# Patient Record
Sex: FEMALE | Race: OTHER | URBAN - METROPOLITAN AREA
[De-identification: names, ages, dates, MRNs, and addresses within clinical notes are randomized per-mention and may not be internally consistent; named-entity substitution may affect disease eponyms.]

---

## 2017-02-10 ENCOUNTER — HOSPITAL ENCOUNTER (OUTPATIENT)
Dept: LAB | Age: 32
Discharge: HOME | End: 2017-02-10

## 2017-02-10 ENCOUNTER — OFFICE VISIT (OUTPATIENT)
Dept: FAMILY MEDICINE CLINIC | Age: 32
End: 2017-02-10

## 2017-02-10 VITALS
HEART RATE: 87 BPM | TEMPERATURE: 98.3 F | WEIGHT: 142 LBS | BODY MASS INDEX: 25.35 KG/M2 | DIASTOLIC BLOOD PRESSURE: 89 MMHG | SYSTOLIC BLOOD PRESSURE: 129 MMHG

## 2017-02-10 DIAGNOSIS — Z01.419 WELL WOMAN EXAM: Primary | ICD-10-CM

## 2017-02-10 DIAGNOSIS — K21.9 GASTROESOPHAGEAL REFLUX DISEASE, ESOPHAGITIS PRESENCE NOT SPECIFIED: ICD-10-CM

## 2017-02-10 PROCEDURE — 87591 N.GONORRHOEAE DNA AMP PROB: CPT | Performed by: FAMILY MEDICINE

## 2017-02-10 PROCEDURE — 87491 CHLMYD TRACH DNA AMP PROBE: CPT | Performed by: FAMILY MEDICINE

## 2017-02-10 PROCEDURE — 88142 CYTOPATH C/V THIN LAYER: CPT | Performed by: FAMILY MEDICINE

## 2017-02-10 RX ORDER — RANITIDINE HCL 75 MG
75 TABLET ORAL 2 TIMES DAILY
Qty: 60 TAB | Refills: 2 | Status: SHIPPED | OUTPATIENT
Start: 2017-02-10 | End: 2018-04-27

## 2017-02-10 NOTE — PROGRESS NOTES
Coordination of Care  1. Have you been to the ER, urgent care clinic since your last visit? Hospitalized since your last visit? No    2. Have you seen or consulted any other health care providers outside of the [address] [place] [address] since your last visit? Include any pap smears or colon screening. No    Medications  Medication Reconciliation Performed: yes  Patient does not need refills     Learning Assessment Complete?  yes

## 2017-02-10 NOTE — MR AVS SNAPSHOT
Visit Information Radha Sanchez Personal Médico Departamento Teléfono del Dep. Número de visita 2/10/2017  1:15 PM Marguerite Mondragon, [address] [place] [address] [phone] Follow-up Instructions Return if symptoms worsen or fail to improve. Upcoming Health Maintenance Date Due DTaP/Tdap/Td series (1 - Tdap) 9/6/2004 PAP AKA CERVICAL CYTOLOGY 9/6/2004 INFLUENZA AGE 9 TO ADULT 8/1/2016 Alergias  Review Complete El: 2/10/2017 Por: Marguerite Mondragon MD  
 A partir del:  2/10/2017 No Known Allergies Vacunas actuales Mardee Romano No hay ninguna vacuna archivada. No revisadas esta visita You Were Diagnosed With   
  
 Alex Colder Well woman exam    -  Primary ICD-10-CM: Z79.979 ICD-9-CM: V72.31 Gastroesophageal reflux disease, esophagitis presence not specified     ICD-10-CM: K21.9 ICD-9-CM: 530.81 Partes vitales PS Pulso Temperatura Peso (percentil de crecimiento) LMP (última sachi) BMI (IMC)  
 129/89 (BP 1 Location: Right arm, BP Patient Position: Sitting) 87 98.3 °F (36.8 °C) (Oral) 142 lb (64.4 kg) 01/20/2017 25.35 kg/m2 Estado obstétrico Estatus de tabaquísmo Having regular periods Never Smoker Historial de signos vitales BMI and BSA Data Body Mass Index Body Surface Area  
 25.35 kg/m 2 1.69 m 2 Evalee Risser Pharmacy Name Phone [facility] [address] [facility] [phone] Rees lista de medicamentos actualizada Lista actualizada el: 2/10/17  1:42 PM.  [place] use rees lista de medicamentos más reciente.  
  
  
  
  
 loratadine 10 mg tablet También conocido rikki:  Nury [place] Take 1 Tab by mouth daily as needed for Allergies. Saira 1 tab diario para alergias  
  
 raNITIdine 75 mg tablet También conocido rikki:  ZANTAC Take 1 Tab by mouth two (2) times a day. Recetas Enviado a la Manju Refills  
 raNITIdine (ZANTAC) 75 mg tablet 2 Sig: Take 1 Tab by mouth two (2) times a day. Class: Normal  
 Pharmacy: [address] Ph #: [phone] Route: Oral  
  
Hicimos lo siguiente PAP, LB, RFX HPV DPRWE([id number]) [id number] CPT(R)] Instrucciones de seguimiento Return if symptoms worsen or fail to improve. Introducing [facility]! [facility] introduce portal paciente MyChart . Ahora se puede acceder a partes de jameson expediente médico, enviar por correo electrónico la oficina de jameson médico y solicitar renovaciones de medicamentos en línea. En jameson navegador de Internet , Mireya Galeana a [email or web address] Jesus clic en el usuario por Mohan Ted? Evangelist Short clic aquí en la sesión [facility]. Verá la página de registro [place]. Ingrese jameson código de Bank of Janice bryson y rikki aparece a continuación. Usted no tendrá que [employer] código después de ayse completado el proceso de registro . Si usted no se inscribe antes de la fecha de caducidad , debe solicitar un nuevo código. · MyChart Código de acceso : [id number] Expires: 5/11/2017  1:42 PM 
 
Ingresa los últimos cuatro dígitos de jameson Número de Seguro Social ( xxxx ) y fecha de nacimiento ( dd / mm / aaaa ) rikki se indica y jesus clic en Enviar. Usted será llevado a la siguiente página de registro . Crear un ID MyChart . Esta será jameson ID de inicio de sesión de MyChart y no puede ser [place] , por lo que pensar en seng que es Selinda Alycia y fácil de recordar . Crear seng contraseña MyChart . Usted puede cambiar jameson contraseña en cualquier momento . Ingrese jameson Password Reset de preguntas y [place] . [place] se puede utilizar en un momento posterior si usted olvida jameson contraseña. Introduzca jameson dirección de correo electrónico . Herson Galvin recibirá seng notificación por correo electrónico cuando la nueva información está disponible en MyChart . Ozzie arroyo en Registrarse. Dennice Perry cornelius y descargar porciones de jameson expediente médico. 
Karina cruz en el enlace de descarga del menú Resumen para descargar seng copia portátil de jameson información médica . Si tiene [employer] , por favor visite la sección de preguntas frecuentes del sitio web MyChart . Recuerde, MyChart NO es que se utilizará para las necesidades urgentes. Para emergencias médicas , llame al 911 . Ahora disponible en jameson iPhone y Android ! Por favor proporcione elijah resumen de la documentación de cuidado a jameson próximo proveedor. If you have any questions after today's visit, please call [phone].

## 2017-02-10 NOTE — PROGRESS NOTES
Subjective:     Chief Complaint   Patient presents with    Well Woman     well woman exam     She  is a 33 y.o. female who presents for evaluation of:  Here for pelvic exam with pap. No sx. Doing well. PPI helped with GI sx when using. No H Pylori. No other concerns today. ROS  Gen - no fever/chills  Resp - no dyspnea or cough  CV - no chest pain or SHEARER  Rest per HPI    No past medical history on file. No past surgical history on file. Current Outpatient Prescriptions on File Prior to Visit   Medication Sig Dispense Refill    loratadine (CLARITIN) 10 mg tablet Take 1 Tab by mouth daily as needed for Allergies. Saira 1 tab diario para alergias 30 Tab 0     No current facility-administered medications on file prior to visit. Objective:     Vitals:    02/10/17 1325 02/10/17 1330   BP: (!) 122/97 129/89   Pulse: 81 87   Temp: 98.3 °F (36.8 °C)    TempSrc: Oral    Weight: 142 lb (64.4 kg)      Physical Examination:  General appearance - alert, well appearing, and in no distress  Eyes -sclera anicteric   - nml vag mucosa, some whitish vaginal d/c, nml cervix, no masses noted    Assessment/ Plan:   Donato Mckinnon was seen today for well woman. Diagnoses and all orders for this visit:    Well woman exam  -     PAP, LB, RFX HPV [id number]([id number])  -     CHLAMYDIA / GC AMPLIFICATION    Gastroesophageal reflux disease, esophagitis presence not specified  -     raNITIdine (ZANTAC) 75 mg tablet; Take 1 Tab by mouth two (2) times a day. I have discussed the diagnosis with the patient and the intended plan as seen in the above orders. The patient has received an after-visit summary and questions were answered concerning future plans. I have discussed medication side effects and warnings with the patient as well. The patient verbalizes understanding and agreement with the plan. Follow-up Disposition:  Return if symptoms worsen or fail to improve.

## 2017-02-13 LAB
C TRACH DNA SPEC QL NAA+PROBE: NEGATIVE
N GONORRHOEA DNA SPEC QL NAA+PROBE: NEGATIVE
SAMPLE TYPE: NORMAL
SERVICE CMNT-IMP: NORMAL
SPECIMEN SOURCE: NORMAL

## 2017-03-06 NOTE — PROGRESS NOTES
With the assistance of [employer]  # [id number], attempted to reach the patient to review her lab result. There was no answer at her home/cell phone number, so a generic message was left to please call O.  Ja Hart RN

## 2017-05-26 ENCOUNTER — OFFICE VISIT (OUTPATIENT)
Dept: FAMILY MEDICINE CLINIC | Age: 32
End: 2017-05-26

## 2017-05-26 VITALS
SYSTOLIC BLOOD PRESSURE: 121 MMHG | WEIGHT: 146 LBS | DIASTOLIC BLOOD PRESSURE: 85 MMHG | BODY MASS INDEX: 26.06 KG/M2 | TEMPERATURE: 98.4 F | HEART RATE: 84 BPM

## 2017-05-26 DIAGNOSIS — G89.29 CHRONIC MIDLINE LOW BACK PAIN WITHOUT SCIATICA: Primary | ICD-10-CM

## 2017-05-26 DIAGNOSIS — M54.50 CHRONIC MIDLINE LOW BACK PAIN WITHOUT SCIATICA: Primary | ICD-10-CM

## 2017-05-26 DIAGNOSIS — R10.9 ABDOMINAL PAIN, UNSPECIFIED LOCATION: ICD-10-CM

## 2017-05-26 RX ORDER — NAPROXEN 500 MG/1
500 TABLET ORAL 2 TIMES DAILY WITH MEALS
Qty: 60 TAB | Refills: 1 | Status: SHIPPED | OUTPATIENT
Start: 2017-05-26 | End: 2018-04-27

## 2017-05-26 NOTE — PROGRESS NOTES
Subjective:     Chief Complaint   Patient presents with    Back Pain     Back pain X about 7 months    Abdominal Pain     Lower  abdominal pain on and off X about 1 year        She  is a 33 y.o. female who presents for evaluation of chronic LBP and abdominal pain. Onset of each approx one year ago. Pt points to her midline lower abdomen as source of pain. Abdominal pain is pain worse in the AM and at night. Occurs 2-3x month. Slight increase when she is on her period. Back pain worse at night. Occurs daily. Pt works as a , 3x week 8 hours/week. Has been working like 1 year. No assoc diarrhea, N/V/D.         ROS  Gen - no fever/chills  Resp - no dyspnea or cough  CV - no chest pain or SHEARER  Rest per HPI    No past medical history on file. No past surgical history on file. Current Outpatient Prescriptions on File Prior to Visit   Medication Sig Dispense Refill    raNITIdine (ZANTAC) 75 mg tablet Take 1 Tab by mouth two (2) times a day. 60 Tab 2    loratadine (CLARITIN) 10 mg tablet Take 1 Tab by mouth daily as needed for Allergies. Saira 1 tab diario para alergias 30 Tab 0     No current facility-administered medications on file prior to visit. Objective:     Vitals:    05/26/17 1518   BP: 121/85   Pulse: 84   Temp: 98.4 °F (36.9 °C)   TempSrc: Oral   Weight: 146 lb (66.2 kg)       Physical Examination:  General appearance - alert, well appearing, and in no distress  Eyes -sclera anicteric  Neck - supple, no significant adenopathy, no thyromegaly  Chest - clear to auscultation, no wheezes, rales or rhonchi, symmetric air entry  Heart - normal rate, regular rhythm, normal S1, S2, no murmurs, rubs, clicks or gallops  Neurological - alert, oriented, no focal findings or movement disorder noted  Abdomen-BS present/WNL x 4 quads, non-distended, soft,no organomegaly, mild tenderness noted over ovaries.    + L5 tenderness midline, neg SL raise, low ext strength 5/5 and gait WNL    Assessment/ Plan:   Jarrell Cheek was seen today for back pain and abdominal pain. Diagnoses and all orders for this visit:    Chronic midline low back pain without sciatica  -     naproxen (NAPROSYN) 500 mg tablet; Take 1 Tab by mouth two (2) times daily (with meals). Abdominal pain, unspecified location  -     naproxen (NAPROSYN) 500 mg tablet; Take 1 Tab by mouth two (2) times daily (with meals). Suspect LBP r/t change in jobs and Hx. Refer to chiropractor and PRN Naproxen. Given low frequency and no attempted remedies of pelvis/low abdominal pain, discussed attempting PRN NSAIDs, heat/ice and   If no improvement or worsening in next 2-3 months, RTC for additional eval. Pt was in agreement with this plan. Had previously been Tx'd for GERD, this pain is lower in pelvis. Pap WNL. I have discussed the diagnosis with the patient and the intended plan as seen in the above orders. The patient has received an after-visit summary and questions were answered concerning future plans. I have discussed medication side effects and warnings with the patient as well. The patient verbalizes understanding and agreement with the plan. Follow-up Disposition:  Return if symptoms worsen or fail to improve.

## 2017-05-26 NOTE — PATIENT INSTRUCTIONS
Dolor abdominal: Instrucciones de cuidado - [ Abdominal Pain: Care Instructions ]  Instrucciones de cuidado    El dolor abdominal tiene muchas causas posibles. Algunas de ellas no son graves y mejoran por sí solas en unos días. Otras requieren Anan Sanborn y [place]. Si jameson dolor continúa o [place], necesitará seng nueva revisión y [place] pruebas para determinar qué pasa. Es posible que necesite cirugía para corregir el problema. No ignore nuevos síntomas, rikki fiebre, náuseas y [place], [address], dolor que [place] o [place]. Podrían ser señales de un problema más grave. Jameson médico puede haberle recomendado seng consulta de Emory & Corina las 8 o 12 horas siguientes. Si no se siente mejor, es posible que requiera Anna Sanborn o [place]. El médico lo starks revisado minuciosamente, zacarias puede ayse problemas más tarde. Si nota algún problema o síntomas nuevos, busque tratamiento médico inmediatamente. La atención de seguimiento es seng parte clave de jameson tratamiento y seguridad. Asegúrese de hacer y acudir a todas las citas, y llame a jameson médico si está teniendo problemas. También es seng buena idea saber los resultados de los exámenes y mantener seng lista de los medicamentos que gideon. ¿Cómo puede cuidarse en el hogar? · Descanse hasta que se sienta mejor. · Para prevenir la deshidratación, junie abundantes líquidos, suficientes para que jameson orina sea de color amarillo andres o transparente rikki el agua. Elija beber agua y otros líquidos tomas sin cafeína hasta que se sienta mejor. Si tiene [employer], del corazón o del hígado y tiene que Zabrina's líquidos, hable con jameson médico antes de aumentar jameson consumo. · Si tiene [employer], coma alimentos suaves, rikki arroz, pan manish seco o galletas saladas, bananas (plátanos) y puré de Synchari. Trate de comer varias comidas pequeñas al día en lugar de dos o armin grandes.   · Espere hasta 50 horas después de que [employer] síntomas hayan desaparecido antes de comer alimentos condimentados, alcohol y bebidas que contengan cafeína. · No consuma alimentos ricos en grasa. · Evite medicamentos antiinflamatorios rikki aspirina, ibuprofeno (Advil, Motrin) y naproxeno (Aleve). Pueden causar [employer]. Dígale a jameson médico si está tomando aspirina diariamente debido a otro problema de erasmo. ¿Cuándo debe pedir ayuda? Llame al 911 en cualquier momento que considere que necesita atención de emergencia. Por ejemplo, llame si:  · Se desmayó (perdió el conocimiento). · Las heces son de color rojizo o muy sanguinolentas (con kristy). · Vomita kristy o algo parecido a granos de café molido. · Tiene dolor abdominal nuevo e intenso. Llame a jameson médico ahora mismo o busque atención médica inmediata si:  · Jameson dolor empeora, sobre todo si se concentra en seng anisha parte del vientre. · Vuelve a tener fiebre o tiene fiebre más nydia. · Sonal heces son negruzcas y parecidas al alquitrán o tienen rastros de [place]. · Tiene sangrado vaginal inesperado. · Tiene síntomas de seng infección del tracto urinario. Estos podrían incluir:  ¨ Dolor al Nance-McCreary. ¨ Orinar con más frecuencia que lo habitual.  ¨ Kristy en la [facility]. · Siente mareos o aturdimiento, o que está a punto de [place]. Preste especial atención a los cambios en jameson erasmo y asegúrese de comunicarse con jameson médico si:  · No está mejorando después de 1 día (24 horas). ¿Dónde puede encontrar más información en inglés? Jessie Jalloh a [email or web address]-[email or web address] Ion Zoila Y150 en la búsqueda para aprender más acerca de \"Dolor abdominal: Instrucciones de cuidado - [ Abdominal Pain: Care Instructions ]. \"  Revisado: 27 altman, 2016  Versión del contenido: 11.2  © 0713-7774 PublishThis, [employer]. Las instrucciones de cuidado fueron adaptadas bajo licencia por [employer] (which disclaims liability or warranty for this information).  Si usted tiene [place] Kamrar afección médica o sobre estas instrucciones, siempre pregunte a jameson profesional de erasmo. [facility] niega toda garantía o responsabilidad por jameson uso de esta información. Naproxeno (Por la boca)   Se [place] para tratar la fiebre y el dolor. También trata la artritis, la gota y el dolor y los calambres Anloy. Elijah es un medicamento antiinflamatorio no esteroideo (DEB). Anne(s) : Aleve, Aleve Arthritis, All Day Pain Relief, All Day Relief, Anaprox, Anaprox DS, EC Naprosyn, Flanax Pain Relief Kit, [facility] All Day Pain Relief, Good Sense All Day Pain Relief, Good Sense Naproxen Sodium, Leader All Day Pain Relief, Mediproxen, Naprelan, NaproPax   Existen muchas otras marcas de Dueñas. Elijah medicamento no debe ser usado cuando:   Elijah medicamento no es adecuado para todas las personas. No lo use si usted alguna vez ha tenido seng reacción alérgica (incluyendo asma) al naproxeno, a la aspirina o a otros medicamentos DEB. No lo use si usted ha tenido [place] en el corazón (rikki cirugía de bypass coronario). Alonso López de usar elijah medicamento:   Chryl Beans de líquido, [place], [place], [place] recubierta, Tableta de liberación prolongada  · Jameson médico le indicara cuanto medicamento necesita usar. No use más medicamento de lo indicado. · Tómese elijah medicamento con alimentos o leche para no causar malestar estomacal. Tómese un vaso lleno de agua después de cada dosis. · Tableta de liberación prolongada: Tráguela entera. No triture, rompa o mastique. · Solución oral: Agite shankar victorina antes de medir la dosis. Mida el líquido oral con [place]on [place], [place] para uso oral o taza especialmente marcadas para medir medicamentos. · [address] medicamento si usted está usando elijah medicamento sin prescripción médica. · Rekha debe venir con Concordia Guía del medicamento. Solicite seng copia con jameson farmacéutico en bladimir de no tener la guía.   · Si olvida seng dosis: Si olvida seng dosis de jameson medicamento, tómelo lo más pronto posible. Si es nnamdi la hora para jameson próxima dosis, espere hasta entonces para cinthia jameson dosis regular. No use medicamento adicional para reponer la dosis olvidada. · Guarde el medicamento en un recipiente cerrado a temperatura ambiente y alejado del calor, la humedad y la laron directa. Solución oral: No lo congele. Medicamentos y [employer]ela Tire que debe evitar:   Consulte con jameson médico o farmacéutico antes de usar cualquier medicamento, incluyendo los que compra sin receta médica, las vitaminas y los productos herbales. · No use ningún otro medicamento DEB a no ser que jameson médico lo autorice. Algunos otros medicamentos DEB son aspirina, celecoxib, diclofenac, diflunisal, ibuprofeno o salsalato. · Algunos medicamentos podrían afectar la función de naproxeno. Informe a jameson médico si está usando cualquiera de los siguientes:  ¨ Colestiramina, ciclosporina, digoxina, litio, metotrexato, probenecida, sucralfato. ¨ Antiácidos  ¨ Medicamentos para la presión arterial  ¨ Medicamentos para la presión arterial  ¨ Diurético  ¨ Medicamento para tratar la depresión  ¨ Medicamentos esteroideos  · No consuma alcohol mientras esté usando Rekha. Precauciones too el uso de elijah medicamento:   · Informe a jameson médico si está embarazada o amamantando. No use elijah medicamento too la última etapa del embarazo a menos que sea indicado por jameson médico.  · Informe a jameson médico si usted tiene enfermedad renal, enfermedad hepática, anemia, asma, problemas de sangrado, presión arterial nydia, insuficiencia cardíaca, ha sufrido un ataque al corazón reciente, o tiene antecedentes de problemas de estómago o del intestino (incluso sangrado o úlceras). Dígale a jameson médico si usted fuma o gideon alcohol.   · Dueñas puede causar los siguientes problemas:  ¨ Riesgo alto de coágulos de [place], ataque al corazón, derrame cerebral o insuficiencia cardíaca  Luevenia Favors y úlceras en jameson estómago o intestinos  ¨ Daño hepático  ¨ Daño renal  ¨ Niveles altos de potasio en la kristy  ¨ Reacciones graves en la piel  · Llame a jameson médico si los síntomas empeoran, el dolor dura más de 10 días o si la fiebre dura más de 3 días. · Informe al médico o dentista encargado de atenderle que usted está usando glory medicamento, especialmente si usted tiene [place] o un procedimiento. · Glory medicamento podría causarle a usted mareos o somnolencia. No maneje un vehículo ni jesus ninguna tarea que pueda ser peligrosa hasta que usted sepa cómo lo afecta glory medicamento. · La ovulación puede atrasarse en algunas mujeres mientras usan glory medicamento. Hable con jameson médico si usted tiene inquietudes al [employer]. · Dígale a todo médico o dentista encargado de atenderle que usted está usando AIFOTEC. Puede que glory medicamento afecte algunos resultados de [employer]. · El médico solicitará exámenes de laboratorio too las citas de rutina para revisar los efectos de AIFOTEC. Asista a todas fela citas. · Guarde todos los medicamentos fuera del alcance de los niños. Nunca comparta fela medicamentos con Farmeto. Efectos secundarios que pueden presentarse too el uso de glory medicamento:   Consulte inmediatamente con el médico si nota cualquiera de estos efectos secundarios:  · Reacción alérgica: Comezón o ronchas, hinchazón del john o las janae, hinchazón u hormigueo en la boca o garganta, opresión en el pecho, dificultad para respirar  · Ampollas, despellejamiento, sarpullido neri en la piel.   · Evacuaciones intestinales con kristy o de color carolyn y alquitranadas, lizette dolor de [place], vómitos con kristy o con apariencia de café molido  · Cambio en la cantidad y frecuencia con la que orina  · Dolor en el pecho que puede propagarse, dificultad para respirar, sudoración inusual, desmayos  · [place] oscura o heces pálidas, náuseas, vómitos, falta de apetito, dolor estomacal, coloración amarillenta en la piel u ojos  · Adormecimiento o debilidad en un lado del cuerpo, dolor de obdulia repentino o severo, problemas con el habla, la visión o para caminar  · Aumento rápido de peso, inflamación en fela janae, tobillos, o pies  · Sangrados, moretones o debilidad inusuales. · Cambios en la visión  Consulte con el médico si nota los siguientes efectos secundarios menos graves:   · [place], [place] o estreñimiento leves  · Zumbido en fela oídos, mareo, dolor de obdulia  Consulte con el médico si nota otros efectos secundarios que josefina son causados por elijah medicamento. Llame a jameson médico para consultarle Shelli. Usted puede notificar fela efectos secundarios al FDA al [phone]-[phone]. © 2017 [facility] Information is for End User's use only and may not be sold, redistributed or otherwise used for commercial purposes. Esta información es sólo para uso en educación. Jameson intención no es darle un consejo médico sobre enfermedades o tratamientos. Colsulte con jameson Martinez Augusta farmacéutico antes de seguir cualquier régimen médico para saber si es seguro y efectivo para usted.

## 2017-05-26 NOTE — MR AVS SNAPSHOT
Visit Information Clare Liriano Personal Médico Departamento Teléfono del [facility]. Número de visita 5/26/2017  3:15 PM Rajani Velazquez Sue [facility] [phone] Follow-up Instructions Return if symptoms worsen or fail to improve. Your Appointments 6/13/2017 10:30 AM  
ROUTINE CARE with Anna Gonzalez DC [address] ([facility]) Appt Note: fu  
 [address] [facility] [place] [id number]  
[phone]  
  
   
 [address] [place] [id number]  
  
    
 6/26/2017  1:55 PM  
ROUTINE CARE with Dino Maurer NP [address] ([facility]) Appt Note: fu 5 26 2017  
 [address]  
  
   
 [address] [facility] Upcoming Health Maintenance Date Due DTaP/Tdap/Td series (1 - Tdap) 9/6/2004 INFLUENZA AGE 9 TO ADULT 8/1/2017 PAP AKA CERVICAL CYTOLOGY 2/10/2020 Alergias  Review Complete El: 5/26/2017 Por: Dino Maurer NP  
 Charito David del:  5/26/2017 No Known Allergies Vacunas actuales Elan Radar No hay ninguna vacuna archivada. No revisadas esta visita Partes vitales PS Pulso Temperatura Peso (percentil de crecimiento) LMP (última sachi) BMI (IMC)  
 121/85 (BP 1 Location: Right arm, BP Patient Position: Sitting) 84 98.4 °F (36.9 °C) (Oral) 146 lb (66.2 kg) 05/06/2017 26.06 kg/m2 Estado obstétrico Estatus de tabaquísmo Having regular periods Never Smoker BMI and BSA Data Body Mass Index Body Surface Area 26.06 kg/m 2 1.71 m 2 Illa Jennifer [facility]acy Name Phone [facility] [address] [facility] [phone] Rees lista de medicamentos actualizada Lista actualizada el: 5/26/17  4:07 PM.  Solange Boland use rees lista de medicamentos más reciente.  
  
  
  
  
 loratadine 10 mg tablet También conocido rikki:  Tessa Hope Take 1 Tab by mouth daily as needed for Allergies. Saira 1 tab diario para alergias  
  
 naproxen 500 mg tablet También conocido rikki:  NAPROSYN Take 1 Tab by mouth two (2) times daily (with meals). raNITIdine 75 mg tablet También conocido rikki:  ZANTAC Take 1 Tab by mouth two (2) times a day. Recetas Enviado a la Manju Refills  
 naproxen (NAPROSYN) 500 mg tablet 1 Sig: Take 1 Tab by mouth two (2) times daily (with meals). Class: Normal  
 Pharmacy: [facility], [facility] [facility]: [phone] Route: Oral  
  
Instrucciones de seguimiento Return if symptoms worsen or fail to improve. Instrucciones para el Paciente Dolor abdominal: Instrucciones de cuidado - [ Abdominal Pain: Care Instructions ] Instrucciones de cuidado El dolor abdominal tiene muchas causas posibles. Algunas de ellas no son graves y mejoran por sí solas en unos días. Otras requieren [place] y [place]. Si rees dolor continúa o [place], necesitará seng nueva revisión y [place] pruebas para determinar qué pasa. Es posible que necesite cirugía para corregir el problema. No ignore nuevos síntomas, rikki fiebre, náuseas y [place], [facility], dolor que [place] o [place]. Podrían ser señales de un problema más grave. Rees médico puede haberle recomendado seng consulta de Emory Kongn las 8 o 12 horas siguientes. Si no se siente mejor, es posible que requiera [place] o [place]. El médico lo starks revisado minuciosamente, zacarias puede ayse problemas más tarde. Si nota algún problema o síntomas nuevos, busque tratamiento médico inmediatamente. La atención de seguimiento es seng parte clave de rees tratamiento y seguridad. Asegúrese de hacer y acudir a todas las citas, y llame a rees médico si está teniendo problemas.  También es seng buena idea [employer] resultados de los exámenes y mantener seng lista de los medicamentos que gideon. Cómo puede cuidarse en el hogar? · Descanse hasta que se sienta mejor. · Para prevenir la deshidratación, junie abundantes líquidos, suficientes para que jameson orina sea de color amarillo andres o transparente rikki el agua. Elija beber agua y otros líquidos tomas sin cafeína hasta que se sienta mejor. Si tiene [facility], del corazón o del hígado y tiene que Zabrina's líquidos, hable con jameson médico antes de aumentar jameson consumo. · Si tiene [employer], coma alimentos suaves, rikki arroz, pan manish seco o galletas saladas, bananas (plátanos) y puré de Synchari. Trate de comer varias comidas pequeñas al día en lugar de dos o armin grandes. · Espere hasta 48 horas después de que todos los síntomas hayan desaparecido antes de comer alimentos condimentados, alcohol y bebidas que contengan cafeína. · No consuma alimentos ricos en grasa. · Evite medicamentos antiinflamatorios rikki aspirina, ibuprofeno (Advil, Motrin) y naproxeno (Aleve). Pueden causar [employer]. Dígale a jameson médico si está tomando aspirina diariamente debido a otro problema de erasmo. Cuándo debe pedir ayuda? Llame al 911 en cualquier momento que considere que necesita atención de emergencia. Por ejemplo, llame si: 
· Se desmayó (perdió el conocimiento). · Las heces son de color rojizo o muy sanguinolentas (con kristy). · Vomita kristy o algo parecido a granos de café molido. · Tiene dolor abdominal nuevo e intenso. Llame a jameson médico ahora mismo o busque atención médica inmediata si: 
· Jameson dolor empeora, sobre todo si se concentra en seng anisha parte del vientre. · Vuelve a tener fiebre o tiene fiebre más nydia. · Sonal heces son negruzcas y parecidas al alquitrán o tienen rastros de Na[place]. · Tiene sangrado vaginal inesperado. · Tiene síntomas de seng infección del tracto urinario. Estos podrían incluir: ¨ Dolor al [place]. ¨ Orinar con más frecuencia que lo habitual. 
¨ Roberto en la [facility]. · Siente mareos o aturdimiento, o que está a punto de [place]. Preste especial atención a los cambios en jameson erasmo y asegúrese de comunicarse con jameson médico si: 
· No está mejorando después de 1 día (24 horas). Dónde puede encontrar más información en inglés? Alexandra Ye a http://jessica-eric.info/. Justice Henning A562 en la búsqueda para aprender más acerca de \"Dolor abdominal: Instrucciones de cuidado - [ Abdominal Pain: Care Instructions ]. \" 
Revisado: 27 altman, 2016 Versión del contenido: 11.2 © 6504-0678 Healthwise, Incorporated. Las instrucciones de cuidado fueron adaptadas bajo licencia por [employer] (which disclaims liability or warranty for this information). Si usted tiene [place]o afección médica o sobre estas instrucciones, siempre pregunte a jameson profesional de erasmo. Healthwise, Incorporated niega toda garantía o responsabilidad por jameson uso de esta información. Naproxeno (Por la boca) Se [place] para tratar la fiebre y el dolor. También trata la artritis, la gota y el dolor y los calambres Anloy. Glory es un medicamento antiinflamatorio no esteroideo (DEB). Anne(s) : Aleve, Aleve Arthritis, All Day Pain Relief, All Day Relief, Anaprox, Anaprox DS, EC Naprosyn, Flanax Pain Relief Kit, [facility] All Day Pain Relief, Good Sense All Day Pain Relief, Good Sense Naproxen Sodium, Leader All Day Pain Relief, Mediproxen, Naprelan, NaproPax Existen muchas otras marcas de Dueñas. Glory medicamento no debe ser usado cuando:  
Glory medicamento no es adecuado para todas las personas. No lo use si usted alguna vez ha tenido seng reacción alérgica (incluyendo asma) al naproxeno, a la aspirina o a otros medicamentos DEB. No lo use si usted starks tenido [place] en el corazón (rikki cirugía de bypass coronario). Forma de usar glory medicamento: Cápsula rellena de líquido, [place], [place], [place], [place] de liberación prolongada · Jameson médico le indicara cuanto medicamento necesita usar. No use más medicamento de lo indicado. · Tómese elijah medicamento con alimentos o leche para no causar malestar estomacal. Tómese un vaso lleno de agua después de cada dosis. · Tableta de liberación prolongada: Tráguela entera. No triture, rompa o mastique. · Solución oral: Agite shankar victorina antes de medir la dosis. Mida el líquido oral con Mayra Win[place], [place] para uso oral o taza especialmente marcadas para medir medicamentos. · [address] medicamento si usted está usando elijah medicamento sin prescripción médica. · [facility] debe venir con Sina Ley del medicamento. Solicite seng copia con jameson farmacéutico en bladimir de no tener la guía. · Si olvida seng dosis: Si olvida seng dosis de jameson medicamento, tómelo lo más pronto posible. Si es nnamdi la hora para jameson próxima dosis, espere hasta entonces para cinthia jameson dosis regular. No use medicamento adicional para reponer la dosis olvidada. · Guarde el medicamento en un recipiente cerrado a temperatura ambiente y alejado del calor, la humedad y la laron directa. Solución oral: No lo congele. Medicamentos y [employer] que debe evitar:  
Consulte con jameson médico o farmacéutico antes de usar cualquier medicamento, incluyendo los que compra sin receta médica, las vitaminas y los productos herbales. · No use ningún otro medicamento DEB a no ser que jameson médico lo autorice. Algunos otros medicamentos DEB son aspirina, celecoxib, diclofenac, diflunisal, ibuprofeno o salsalato. · Algunos medicamentos podrían afectar la función de naproxeno. Informe a jameson médico si está usando cualquiera de los siguientes: ¨ Colestiramina, ciclosporina, digoxina, litio, metotrexato, probenecida, sucralfato. ¨ Antiácidos ¨ Medicamentos para la presión arterial 
¨ Medicamentos para la presión arterial 
 ¨ Diurético 
¨ Medicamento para tratar la depresión ¨ Medicamentos esteroideos · No consuma alcohol mientras esté . Precauciones too el uso de Johnathan medicamento: · Informe a jameosn médico si está embarazada o amamantando. No use glory medicamento too la última etapa del embarazo a menos que sea indicado por jameson médico. 
· Informe a jameson médico si usted tiene enfermedad renal, enfermedad hepática, anemia, asma, problemas de sangrado, presión arterial nydia, insuficiencia cardíaca, ha sufrido un ataque al corazón reciente, o tiene antecedentes de problemas de estómago o del intestino (incluso sangrado o úlceras). Dígale a jameson médico si usted fuma o gideon alcohol. · Glory medicamento puede causar los siguientes problemas: ¨ Riesgo alto de coágulos de [place], ataque al corazón, derrame cerebral o insuficiencia cardíaca ¨ Sangrado y úlceras en jameson estómago o intestinos ¨ Daño hepático 
¨ Daño renal 
¨ Niveles altos de potasio en la Nahed ¨ Reacciones graves en la piel · Llame a jameson médico si los síntomas empeoran, el dolor dura más de 10 días o si la fiebre dura más de 3 días. · Informe al médico o dentista encargado de atenderle que usted está usando glory medicamento, especialmente si usted tiene [place] o un procedimiento. · Glory medicamento podría causarle a usted mareos o somnolencia. No maneje un vehículo ni jesus ninguna tarea que pueda ser peligrosa hasta que usted sepa cómo lo afecta glory medicamento. · La ovulación puede atrasarse en algunas mujeres mientras usan glory medicamento. Hable con jameson médico si usted tiene inquietudes al [employer]. · Dígale a todo médico o dentista encargado de atenderle que usted está usando Circlefive. Puede que glory medicamento afecte algunos resultados de SCANHelveta. · El médico solicitará exámenes de laboratorio too las citas de rutina para revisar los efectos de Circlefive. Asista a todas fela citas. · Guarde todos los medicamentos fuera del alcance de los niños. Nunca comparta fela medicamentos con [facility]. Efectos secundarios que pueden presentarse too el uso de elijah medicamento:  
Consulte inmediatamente con el médico si nota cualquiera de estos efectos secundarios: 
· Reacción alérgica: Comezón o ronchas, hinchazón del john o las janae, hinchazón u hormigueo en la boca o garganta, opresión en el pecho, dificultad para respirar · Ampollas, despellejamiento, sarpullido neri en la piel. · Evacuaciones intestinales con kristy o de color carolyn y alquitranadas, lizette dolor de [place], vómitos con kristy o con apariencia de café molido · Cambio en la cantidad y frecuencia con la que [place] · Dolor en el pecho que puede propagarse, dificultad para respirar, sudoración inusual, desmayos · Orina oscura o heces pálidas, náuseas, vómitos, falta de apetito, dolor estomacal, coloración amarillenta en la piel u ojos · Adormecimiento o debilidad en un lado del cuerpo, dolor de obdulia repentino o severo, problemas con el habla, la visión o para caminar · Aumento rápido de peso, inflamación en fela janae, tobillos, o pies · Sangrados, moretones o debilidad inusuales. · Cambios en la visión Consulte con el médico si nota los siguientes efectos secundarios menos graves:  
· [place], [place] o estreñimiento leves · Zumbido en fela oídos, mareo, dolor de [place] Consulte con el médico si nota otros efectos secundarios que josefina son causados por elijah medicamento. Llame a jameson médico para consultarle Shelli. Usted puede notificar fela efectos secundarios al FDA al [phone]. © 2017 [facility] Information is for End User's use only and may not be sold, redistributed or otherwise used for commercial purposes. Esta información es sólo para uso en educación. Jameson intención no es darle un consejo médico sobre enfermedades o tratamientos.  Colsulte con jameson Wendy Pinto farmacéutico antes de seguir cualquier régimen médico para saber si es seguro y efectivo para usted. Introducing [facility]! [facility] introduce portal paciente MyChart . Ahora se puede acceder a partes de jameson expediente médico, enviar por correo electrónico la oficina de jameson médico y solicitar renovaciones de medicamentos en línea. En jameson navegador de Internet , Fidelia Rg a [email or web address] Jesus clic en el usuario por Fernandez Celaya? Viral Dank clic aquí en la sesión [facility]. Verá la página de registro [place]. Ingrese jameson código de [facility] bryson y rikki aparece a continuación. Usted no tendrá que [employer] código después de ayse completado el proceso de registro . Si usted no se inscribe antes de la fecha de caducidad , debe solicitar un nuevo código. · MyChart Código de acceso : [id number] Expires: 8/24/2017  4:07 PM 
 
Ingresa los últimos cuatro dígitos de jameson Número de Seguro Social ( xxxx ) y fecha de nacimiento ( dd / mm / aaaa ) rikki se indica y jesus clic en Enviar. Usted será llevado a la siguiente página de registro . Crear un ID MyChart . Esta será jameson ID de inicio de sesión de MyChart y no puede ser [place] , por lo que pensar en seng que es Ruby Jenny y fácil de recordar . Crear seng contraseña MyChart . Usted puede cambiar jameson contraseña en cualquier momento . Ingrese jameson Password Reset de preguntas y [place] . [place] se puede utilizar en un momento posterior si usted olvida jameson contraseña. Introduzca jameson dirección de correo electrónico . Yvette Anish recibirá seng notificación por correo electrónico cuando la nueva información está disponible en MyChart . Valentin arroyo en Registrarse. Azucena Abide cornelius y descargar porciones de jameson expediente médico. 
Jesus cruz en el enlace de descarga del menú Resumen para descargar seng copia portátil de jameson información médica . Si tiene [employer] , por favor visite la sección de preguntas frecuentes del sitio web MyChart . Recuerde, MyChart NO es que se utilizará para las necesidades urgentes. Para emergencias médicas , llame al 911 . Ahora disponible en jameson iPhone y Android ! Por favor proporcione elijah resumen de la documentación de cuidado a jameson próximo proveedor. If you have any questions after today's visit, please call [phone].

## 2017-05-26 NOTE — PROGRESS NOTES
Coordination of Care  1. Have you been to the ER, urgent care clinic since your last visit? Hospitalized since your last visit? No    2. Have you seen or consulted any other health care providers outside of the [address] [place] [address] since your last visit? Include any pap smears or colon screening. No    Medications  Medication Reconciliation Performed: yes  Patient does not know need refills     Learning Assessment Complete?  yes

## 2017-09-14 ENCOUNTER — OFFICE VISIT (OUTPATIENT)
Dept: FAMILY MEDICINE CLINIC | Age: 32
End: 2017-09-14

## 2017-09-14 VITALS
DIASTOLIC BLOOD PRESSURE: 80 MMHG | HEIGHT: 64 IN | SYSTOLIC BLOOD PRESSURE: 116 MMHG | HEART RATE: 97 BPM | BODY MASS INDEX: 24.69 KG/M2 | TEMPERATURE: 98.6 F | WEIGHT: 144.6 LBS

## 2017-09-14 DIAGNOSIS — L23.2 ALLERGIC CONTACT DERMATITIS DUE TO COSMETICS: Primary | ICD-10-CM

## 2017-09-14 NOTE — PROGRESS NOTES
Coordination of Care  1. Have you been to the ER, urgent care clinic since your last visit? Hospitalized since your last visit? No    2. Have you seen or consulted any other health care providers outside of the [address] [place] [address] since your last visit? Include any pap smears or colon screening. No    Medications  Medication Reconciliation Performed: yes  Patient does not need refills     Learning Assessment Complete?  yes

## 2017-09-14 NOTE — PROGRESS NOTES
Subjective:     Chief Complaint   Patient presents with    Eye Problem     Pt c/o R eye swelling and redness x1 month on and off        She  is a 27 y.o. female who presents for evaluation of R eye pain/swelling. Pt notes swelling is minimal today, but will intermittently get swollen. Denies any purulent discharge, visual changes/disturbances nor     + recent Hx of changing cosmetics in last 4-6 weeks. Pt also notes yesterday onset of dry cough. Pt notes throat pain and h/a. Denies fevers. ROS  Gen - no fever/chills  Resp - no dyspnea or cough  CV - no chest pain or SHEARER  Rest per HPI    No past medical history on file. No past surgical history on file. Current Outpatient Prescriptions on File Prior to Visit   Medication Sig Dispense Refill    naproxen (NAPROSYN) 500 mg tablet Take 1 Tab by mouth two (2) times daily (with meals). 60 Tab 1    raNITIdine (ZANTAC) 75 mg tablet Take 1 Tab by mouth two (2) times a day. 60 Tab 2    loratadine (CLARITIN) 10 mg tablet Take 1 Tab by mouth daily as needed for Allergies. Saira 1 tab diario para alergias 30 Tab 0     No current facility-administered medications on file prior to visit. Objective:     Vitals:    09/14/17 0900   BP: 116/80   Pulse: 97   Temp: 98.6 °F (37 °C)   TempSrc: Oral   Weight: 144 lb 9.6 oz (65.6 kg)   Height: 5' 3.58\" (1.615 m)       Physical Examination:  General appearance - alert, well appearing, and in no distress  Eyes -sclera anicteric  Neck - supple, no significant adenopathy, no thyromegaly  Chest - clear to auscultation, no wheezes, rales or rhonchi, symmetric air entry  Heart - normal rate, regular rhythm, normal S1, S2, no murmurs, rubs, clicks or gallops  Neurological - alert, oriented, no focal findings or movement disorder noted    No noted abnormalities noted during R eye exam.     HENT exam unremarkable     Assessment/ Plan:   Diagnoses and all orders for this visit:    1.  Allergic contact dermatitis due to cosmetics       Given Hx/reaction to cosmetics, recommend Pt swap back to prior brands and if no improvement in 4-6 weeks,  RTC PRN. Given no other abnormal exam findings and Hx, recommend Pt Tx cough with saline gargles and saline nasal spray. Discussed which changes in S&S warrant re-eval.     I have discussed the diagnosis with the patient and the intended plan as seen in the above orders. The patient has received an after-visit summary and questions were answered concerning future plans. I have discussed medication side effects and warnings with the patient as well. The patient verbalizes understanding and agreement with the plan. Follow-up Disposition:  Return if symptoms worsen or fail to improve.

## 2017-09-14 NOTE — MR AVS SNAPSHOT
Visit Information Sarahy Monge Personal Médico Departamento Teléfono del [facility]. Número de visita 9/14/2017  8:50 AM Ankita PaDonte [facility] [phone] [id number] Follow-up Instructions Return if symptoms worsen or fail to improve. Upcoming Health Maintenance Date Due DTaP/Tdap/Td series (1 - Tdap) 9/6/2004 INFLUENZA AGE 9 TO ADULT 8/1/2017 PAP AKA CERVICAL CYTOLOGY 2/10/2020 Alergias  Review Complete El: 9/14/2017 Por: JASMIN Lovelace del:  9/14/2017 No Known Allergies Vacunas actuales Foster Mini No hay ninguna vacuna archivada. No revisadas esta visita Partes vitales PS Pulso Temperatura Spivey ( percentil de crecimiento) Peso (percentil de crecimiento) LMP (última sachi) 116/80 (BP 1 Location: Left arm, BP Patient Position: Sitting) 97 98.6 °F (37 °C) (Oral) 5' 3.58\" (1.615 m) 144 lb 9.6 oz (65.6 kg) 08/20/2017 (Exact Date) BMI [facility]) Estado obstétrico Estatus de tabaquísmo 25.15 kg/m2 Having regular periods Never Smoker Historial de signos vitales BMI and BSA Data Body Mass Index Body Surface Area  
 25.15 kg/m 2 1.72 m 2 [facility] Name Phone [facility] [address] [place] [address] [phone] Rees lista de medicamentos actualizada Lista actualizada el: 9/14/17  9:42 AM.  Claudio Hemanth use rees lista de medicamentos más reciente.  
  
  
  
  
 loratadine 10 mg tablet También conocido rikki:  Roro Badder Take 1 Tab by mouth daily as needed for Allergies. Saira 1 tab diario para alergias  
  
 naproxen 500 mg tablet También conocido rikki:  NAPROSYN Take 1 Tab by mouth two (2) times daily (with meals). raNITIdine 75 mg tablet También conocido rikki:  ZANTAC Take 1 Tab by mouth two (2) times a day. Instrucciones de seguimiento Return if symptoms worsen or fail to improve. Instrucciones para el Paciente Infección viral respiratoria: Instrucciones de cuidado - [ Viral Respiratory Infection: Care Instructions ] Instrucciones de cuidado Los virus son organismos muy pequeños. Se multiplican después de ingresar en el cuerpo. Hay muchos tipos que causan [address], rikki los resfriados y las paperas. Los síntomas de seng infección viral respiratoria a menudo comienzan rápidamente. Incluyen fiebre, dolor de garganta y goteo nasal. También es posible que simplemente no se sienta shankar. O podría no tener [employer]. La mayoría de las infecciones virales respiratorias no son graves. Suelen mejorarse con tiempo y cuidado personal. 
Los antibióticos no se usan para tratar seng infección viral. [place] es porque los antibióticos no ayudan a curar seng enfermedad viral. En algunos casos, los medicamentos antivirales pueden ayudar a jameson organismo a eliminar seng infección viral grave. La atención de seguimiento es seng parte clave de jameson tratamiento y seguridad. Asegúrese de hacer y acudir a todas las citas, y llame a jameson médico si está teniendo problemas. También es seng buena idea saber los resultados de los exámenes y mantener seng lista de los medicamentos que gideon. Cómo puede cuidarse en el hogar? · Descanse lo más que pueda hasta que se sienta mejor. · Sea delmy con los medicamentos. [employer] medicamentos exactamente rikki le fueron recetados. Llame a jameson médico si josefina estar teniendo problemas con jameson medicamento. Recibirá más detalles sobre el medicamento específico recetado por jameson médico. 
· [place] un analgésico (medicamento para el dolor) de venta italia, rikki acetaminofén (Tylenol), ibuprofeno (Advil, Motrin) o naproxeno (Aleve), cuando sea necesario para aliviar el dolor y la [place]. Marian y siga todas las instrucciones de la [place].  No le dé aspirina a ninguna persona malachi de [address]. Ha sido relacionada con el síndrome de [place], seng enfermedad grave. · Prerna abundantes líquidos, los suficientes rikki para que jameson orina sea de color amarillo andres o transparente rikki el agua. Los líquidos calientes, rikki el té o la sopa, podrían ayudarle a aliviar la congestión de la nariz y la garganta. Si tiene [employer], del corazón o del hígado y tiene que Zabrina's líquidos, hable con jameson médico antes de aumentar jameson consumo. · Trate de sacar la mucosidad (flema) de los pulmones respirando profundamente y tosiendo. · Karina gárgaras con agua salada tibia seng vez por hora. [place] puede ayudar a disminuir la hinchazón y el dolor de garganta. Mezcle 1 cucharadita de sal en 1 taza de agua tibia. · No fume ni permita que otros lo aranza cerca de usted. Si necesita ayuda para dejar de fumar, hable con jameson médico sobre programas y medicamentos para dejar de fumar. Estos pueden aumentar fela probabilidades de dejar el hábito para siempre. Para evitar propagar el virus · Tosa o estornude en un pañuelo de papel y luego deséchelo. · Si no tiene un pañuelo de papel, cúbrase con la mano al toser o estornudar y [employer]. También puede toser [employer] manga de jameson ropa. · Lávese las janae con frecuencia. Use agua tibia y [place]. Láveselas de 15 a 20 segundos cada vez. · Si no tiene [place] y jabón a jameson alcance, puede limpiarse las janae con toallitas con alcohol o un gel a base de alcohol. Cuándo debe pedir ayuda? Llame a jameson médico ahora mismo o busque atención médica inmediata si: · Tiene fiebre nueva o que aumenta. · La fiebre dura más de 48 horas. · Tiene dificultades para respirar. · Tiene fiebre junto con rigidez en el jennifer o dolor de obdulia intenso. · Está sensible a la laron. · Se siente muy somnoliento (con sueño) o confuso. Preste especial atención a los cambios en jameson erasmo y asegúrese de comunicarse con jameson médico si: 
· No mejora rikki se esperaba. Dónde puede encontrar más información en inglés? Bernadine Chavez a [email or web address].[email or web address] Abdelrahman Henryon I401 en la búsqueda para aprender más acerca de \"Infección viral respiratoria: Instrucciones de cuidado - [ Viral Respiratory Infection: Care Instructions ]. \" 
Revisado: 25 marzo, 2017 Versión del contenido: 11.3 © 9177-1576 Healthwise, Incorporated. Las instrucciones de cuidado fueron adaptadas bajo licencia por [employer] Conne[employer] (which disclaims liability or warranty for this information). Si usted tiene [place] afección médica o sobre estas instrucciones, siempre pregunte a jameson profesional de erasmo. Healthwise, Incorporated niega toda garantía o responsabilidad por jameson uso de esta información. Introducing [facility]! [facility] introduce portal paciente [employer] . Ahora se puede acceder a partes de jameson expediente médico, enviar por correo electrónico la oficina de jameson médico y solicitar renovaciones de medicamentos en línea. En jameson navegador de Internet , Olivia Causey a [email or web address] clic en el usuario por Ana Philip? Daniel Calle clic aquí en la sesión Jeremie Haagensen. Verá la página de registro [place]. Ingrese jameson código de [employer] Janice bryson y rikki aparece a continuación. Usted no tendrá que [employer] código después de ayse completado el proceso de registro . Si usted no se inscribe antes de la fecha de caducidad , debe solicitar un nuevo código. · MyChart Código de acceso : [id number] Expires: 12/13/2017  9:42 AM 
 
Ingresa los últimos cuatro dígitos de jameson Número de Seguro Social ( xxxx ) y fecha de nacimiento ( dd / mm / aaaa ) rikki se indica y jesus clic en Enviar. Usted será llevado a la siguiente página de registro . Crear un ID MyChart . Esta será jameson ID de inicio de sesión de MyChart y no puede ser [place] , por lo que pensar en seng que es Orie Tyshawn y fácil de recordar . Crear seng contraseña MyChart . Usted puede cambiar jameson contraseña en cualquier momento . Ingrese jameson Password Reset de preguntas y [place] . [place] se puede utilizar en un momento posterior si usted olvida jameson contraseña. Introduzca jameson dirección de correo electrónico . Shannen Sargent recibirá seng notificación por correo electrónico cuando la nueva información está disponible en MyChart . Arlette arroyo en Registrarse. Lorren Norton cornelius y descargar porciones de jameson expediente médico. 
Karina clic en el enlace de descarga del menú Resumen para descargar seng copia portátil de jameson información médica . Si tiene [employer] , por favor visite la sección de preguntas frecuentes del sitio web MyChart . Recuerde, MyChart NO es que se utilizará para las necesidades urgentes. Para emergencias médicas , llame al 911 . Ahora disponible en jameson iPhone y Android ! Por favor proporcione elijah resumen de la documentación de cuidado a jameson próximo proveedor. If you have any questions after today's visit, please call [phone].

## 2017-09-14 NOTE — PATIENT INSTRUCTIONS
Infección viral respiratoria: Instrucciones de cuidado - [ Viral Respiratory Infection: Care Instructions ]  Instrucciones de cuidado  Los virus son organismos muy pequeños. Se multiplican después de ingresar en el cuerpo. Hay muchos tipos que causan [address], rikki los resfriados y las paperas. Los síntomas de seng infección viral respiratoria a menudo comienzan rápidamente. Incluyen fiebre, dolor de garganta y goteo nasal. También es posible que simplemente no se sienta shankar. O podría no tener [employer]. La mayoría de las infecciones virales respiratorias no son graves. Suelen mejorarse con tiempo y cuidado personal.  Los antibióticos no se usan para tratar seng infección viral. [place] es porque los antibióticos no ayudan a curar seng enfermedad viral. En algunos casos, los medicamentos antivirales pueden ayudar a jameson organismo a eliminar seng infección viral grave. La atención de seguimiento es seng parte clave de jameson tratamiento y seguridad. Asegúrese de hacer y acudir a todas las citas, y llame a jameson médico si está teniendo problemas. También es seng buena idea saber los resultados de los exámenes y mantener seng lista de los medicamentos que gideon. ¿Cómo puede cuidarse en el hogar? · Descanse lo más que pueda hasta que se sienta mejor. · Sea delmy con los medicamentos. [employer] medicamentos exactamente rikki le fueron recetados. Llame a jameson médico si josefina estar teniendo problemas con jameson medicamento. Recibirá más detalles sobre el medicamento específico recetado por jameson médico.  · [place] un analgésico (medicamento para el dolor) de venta italia, rikki acetaminofén (Tylenol), ibuprofeno (Advil, Motrin) o naproxeno (Aleve), cuando sea necesario para aliviar el dolor y la [place]. Marian y siga todas las instrucciones de la [place]. No le dé aspirina a ninguna persona malachi de 20 años. [place] sido relacionada con el síndrome de [place], seng enfermedad grave.   · Prerna abundantes líquidos, los suficientes rikki para que jameson orina sea de color amarillo andres o transparente rikki el agua. Los líquidos calientes, rikki el té o la sopa, podrían ayudarle a aliviar la congestión de la nariz y la garganta. Si tiene [employer], del corazón o del hígado y tiene que Zabrina's líquidos, hable con jameson médico antes de aumentar jameson consumo. · Trate de sacar la mucosidad (flema) de los pulmones respirando profundamente y tosiendo. · Karina gárgaras con agua salada tibia seng vez por hora. [place] puede ayudar a disminuir la hinchazón y el dolor de garganta. Mezcle 1 cucharadita de sal en 1 taza de agua tibia. · No fume ni permita que otros lo aranza cerca de usted. Si necesita ayuda para dejar de fumar, hable con jameson médico sobre programas y medicamentos para dejar de fumar. Estos pueden aumentar fela probabilidades de dejar el hábito para siempre. Para evitar propagar el virus  · Tosa o estornude en un pañuelo de papel y luego deséchelo. · Si no tiene un pañuelo de papel, cúbrase con la mano al toser o estornudar y [employer]. También puede toser [employer] manga de jameson ropa. · Lávese las janae con frecuencia. Use agua tibia y [place]. Láveselas de 15 a 20 segundos cada vez. · Si no tiene [place] y jabón a jameson alcance, puede limpiarse las janae con toallitas con alcohol o un gel a base de alcohol. ¿Cuándo debe pedir ayuda? Llame a jameson médico ahora mismo o busque atención médica inmediata si:  · Tiene fiebre nueva o que aumenta. · La fiebre dura más de 48 horas. · Tiene dificultades para respirar. · Tiene fiebre junto con rigidez en el jennifer o dolor de obdulia intenso. · Está sensible a la laron. · Se siente muy somnoliento (con sueño) o confuso. Preste especial atención a los cambios en jameson erasmo y asegúrese de comunicarse con jameson médico si:  · No mejora rikki se esperaba. ¿Dónde puede encontrar más información en inglés? Cynda Boast a [email or web address]   Sharaesphisravani Jurado E889 en la búsqueda para aprender [place] or[place]ra acerca de \"Infección viral respiratoria: Instrucciones de cuidado - [ Viral Respiratory Infection: Care Instructions ]. \"  Revisado: 25 marzo, 2017  Versión del contenido: 11.3  © 1326-6336 Healthwise, Incorporated. Las instrucciones de cuidado fueron adaptadas bajo licencia por Good [employer] Connections (which disclaims liability or warranty for this information). Si usted tiene [place] afección médica o sobre estas instrucciones, siempre pregunte a jameson profesional de erasmo. Healthwise, Incorporated niega toda garantía o responsabilidad por jameson uso de esta información.

## 2018-04-27 ENCOUNTER — OFFICE VISIT (OUTPATIENT)
Dept: FAMILY MEDICINE CLINIC | Age: 33
End: 2018-04-27

## 2018-04-27 VITALS
TEMPERATURE: 98.3 F | WEIGHT: 145 LBS | HEART RATE: 83 BPM | SYSTOLIC BLOOD PRESSURE: 139 MMHG | DIASTOLIC BLOOD PRESSURE: 86 MMHG | HEIGHT: 63 IN | OXYGEN SATURATION: 99 % | BODY MASS INDEX: 25.69 KG/M2

## 2018-04-27 DIAGNOSIS — R06.2 WHEEZING: Primary | ICD-10-CM

## 2018-04-27 DIAGNOSIS — J18.9 COMMUNITY ACQUIRED PNEUMONIA, UNSPECIFIED LATERALITY: ICD-10-CM

## 2018-04-27 DIAGNOSIS — J02.0 STREP THROAT: ICD-10-CM

## 2018-04-27 DIAGNOSIS — J02.9 SORE THROAT: ICD-10-CM

## 2018-04-27 LAB
S PYO AG THROAT QL: POSITIVE
VALID INTERNAL CONTROL?: YES

## 2018-04-27 RX ORDER — ALBUTEROL SULFATE 0.83 MG/ML
2.5 SOLUTION RESPIRATORY (INHALATION) ONCE
Qty: 1 EACH | Refills: 0 | Status: SHIPPED | OUTPATIENT
Start: 2018-04-27 | End: 2018-04-27 | Stop reason: ALTCHOICE

## 2018-04-27 RX ORDER — AZITHROMYCIN 250 MG/1
TABLET, FILM COATED ORAL
Qty: 6 TAB | Refills: 0 | Status: SHIPPED | OUTPATIENT
Start: 2018-04-27 | End: 2018-04-27 | Stop reason: SDUPTHER

## 2018-04-27 RX ORDER — AZITHROMYCIN 250 MG/1
TABLET, FILM COATED ORAL
Qty: 6 TAB | Refills: 0 | Status: SHIPPED | OUTPATIENT
Start: 2018-04-27 | End: 2018-05-02

## 2018-04-27 RX ORDER — PREDNISONE 10 MG/1
TABLET ORAL
Qty: 21 TAB | Refills: 0 | Status: SHIPPED | OUTPATIENT
Start: 2018-04-27 | End: 2018-04-27 | Stop reason: SDUPTHER

## 2018-04-27 RX ORDER — ALBUTEROL SULFATE 0.83 MG/ML
2.5 SOLUTION RESPIRATORY (INHALATION) ONCE
Qty: 1 EACH | Refills: 0
Start: 2018-04-27 | End: 2018-04-27 | Stop reason: SDUPTHER

## 2018-04-27 RX ORDER — ALBUTEROL SULFATE 90 UG/1
2 AEROSOL, METERED RESPIRATORY (INHALATION)
Qty: 1 INHALER | Refills: 2 | Status: SHIPPED | OUTPATIENT
Start: 2018-04-27

## 2018-04-27 RX ORDER — ALBUTEROL SULFATE 90 UG/1
2 AEROSOL, METERED RESPIRATORY (INHALATION)
Qty: 1 INHALER | Refills: 2 | Status: SHIPPED | OUTPATIENT
Start: 2018-04-27 | End: 2018-04-27 | Stop reason: SDUPTHER

## 2018-04-27 RX ORDER — PREDNISONE 10 MG/1
TABLET ORAL
Qty: 21 TAB | Refills: 0 | Status: SHIPPED | OUTPATIENT
Start: 2018-04-27 | End: 2018-05-10

## 2018-04-27 NOTE — PROGRESS NOTES
Results for orders placed or performed in visit on 04/27/18   AMB POC RAPID STREP A   Result Value Ref Range    VALID INTERNAL CONTROL POC Yes     Group A Strep Ag Positive Negative

## 2018-04-27 NOTE — PROGRESS NOTES
Assessment/Plan:    Diagnoses and all orders for this visit:    1. Wheezing  -     albuterol (PROVENTIL VENTOLIN) 2.5 mg /3 mL (0.083 %) nebulizer solution; 3 mL by Nebulization route once for 1 dose. May repeat up to 3 times as needed  -     ALBUTEROL, INHAL. LXI.()    2. Strep throat  3. ? CAP   Treat strep and CAP with azithromycin   Prednisone for wheezing  F/up in 2 weeks for recheck       Follow-up Disposition: Not on [facility] NICOLASA Waterman  [address] [place] [address] expressed understanding of this plan. An AVS was printed and given to the patient.      ----------------------------------------------------------------------    Chief Complaint   Patient presents with    Cough     Cough, shortnes of breath, wheezing, X about 5 days       History of Present Illness:    5 days of sore throat, feverish sxs, shortness of breath  No hx of asthma  No one else sick at home  Not taking any medications  Had had some \"itchy eyes\"   Non smoker  Not pregnant- just had her period, not using any form of birth control      No past medical history on file. Current Outpatient Prescriptions   Medication Sig Dispense Refill    albuterol (PROVENTIL VENTOLIN) 2.5 mg /3 mL (0.083 %) nebulizer solution 3 mL by Nebulization route once for 1 dose. May repeat up to 3 times as needed 1 Each 0    loratadine (CLARITIN) 10 mg tablet Take 1 Tab by mouth daily as needed for Allergies. Saira 1 tab diario para alergias 30 Tab 0       No Known Allergies    Social History   Substance Use Topics    Smoking status: Never Smoker    Smokeless tobacco: Never Used    Alcohol use No       No family history on file.     Physical Exam:     Visit Vitals    /86 (BP 1 Location: Right arm, BP Patient Position: Sitting)    Pulse 83    Temp 98.3 °F (36.8 °C) (Oral)    Ht 5' 3\" (1.6 m)    Wt 145 lb (65.8 kg)    LMP 04/23/2018    SpO2 99%    BMI 25.69 kg/m2     Wearing mask, pleasant, appears that she does not feel well   A&Ox3  WDWN NAD  Respirations normal and non labored  HEENT- TM's full w/out injection  Nares patent  OP red, no exudate + strep test  Neck supple w shoddy nodes  Lungs mild wheezing expiratory codi after neb she had improved sxs and less wheezing

## 2018-04-27 NOTE — PROGRESS NOTES
Pt given 1 Jet Neb tx. Printed AVS, provided to pt and reviewed. Pt indicated understanding and had no questions. Told pt that rx's have been sent to pharmacy and they should be ready for  in approximately 2 hrs. Reviewed all medication's with the pt. The  was Silvia Vaughan. The pt wanted all rx transferred to [facility]. This was done for her.  Silvino Hinojosa RN

## 2018-04-27 NOTE — MR AVS SNAPSHOT
[address] [facility] [address] [place] [address] 
[phone] Patient: Irma Taylor MRN: [id number] QST:8/9/0627 Visit Information Jimmy Lynn Personal Médico Departamento Teléfono del Dep. Número de visita 4/27/2018 10:45 AM KELLY Rothman [address] [phone] Follow-up Instructions Return in about 2 weeks (around 5/11/2018). Upcoming Health Maintenance Date Due DTaP/Tdap/Td series (1 - Tdap) 9/6/2004 Influenza Age 5 to Adult 8/1/2018 PAP AKA CERVICAL CYTOLOGY 2/10/2020 Alergias  Review Complete El: 4/27/2018 Por: Vahe Correa A partir del:  4/27/2018 No Known Allergies Vacunas actuales [place] No hay ninguna vacuna archivada. No revisadas esta visita You Were Diagnosed With   
  
 [facility]    -  Primary ICD-10-CM: R06.2 ICD-9-CM: 786.07 Sore throat     ICD-10-CM: J02.9 ICD-9-CM: 460 Strep throat     ICD-10-CM: J02.0 ICD-9-CM: 034.0 Community acquired pneumonia, unspecified laterality     ICD-10-CM: J18.9 ICD-9-CM: 709 Partes vitales PS Pulso Temperatura [place] ( percentil de crecimiento) Peso (percentil de crecimiento) LMP (última sachi) 139/86 (BP 1 Location: Right arm, BP Patient Position: Sitting) 83 98.3 °F (36.8 °C) (Oral) 5' 3\" (1.6 m) 145 lb (65.8 kg) 04/23/2018 SpO2 BMI (IMC) Estado obstétrico Estatus de tabaquísmo 99% 25.69 kg/m2 Having regular periods Never Smoker Historial de signos vitales BMI and BSA Data Body Mass Index Body Surface Area  
 25.69 kg/m 2 1.71 m 2 [facility] Name Phone Martina Emery [address] [facility], [address] [place] [address] Dr [phone] Rees lista de medicamentos actualizada Anjelica Ge actualizada 4/27/18 11:42 AM.  Helder Krishnan use rees lista de medicamentos más reciente. * albuterol 2.5 mg /3 mL (0.083 %) nebulizer solution También conocido rikki:  PROVENTIL VENTOLIN  
3 mL by Nebulization route once for 1 dose. May repeat up to 3 times as needed * albuterol 90 mcg/actuation inhaler También conocido rikki:  PROVENTIL HFA, VENTOLIN HFA, PROAIR HFA Take 2 Puffs by inhalation every six (6) hours as needed for Wheezing. Please provide the smaller inhaler due to cost  
  
 azithromycin 250 mg tablet También conocido rikki:  Johny Manriquez Si po today then 1 each day until all pills are gone. Theodosia 2 hoy y 1 cada shiva despues  
  
 loratadine 10 mg tablet También conocido rikki:  Jaxon Jacobison Take 1 Tab by mouth daily as needed for Allergies. Saira 1 tab diario para alergias  
  
 predniSONE 10 mg tablet También conocido rikki:  Nico Gaona Si po today then 1 less daily until all are gone. Theodosia 6 hoy y 1 menos pastilla cada shiva * Aviso:  Esta lista contiene medicamentos que son iguales a otros medicamentos recetados para usted. Marian las instrucciones con cuidado y pida a jameson personal médico que revise la lista de medicamentos y las instrucciones correspondientes con usted. Recetas Enviado a la [place] Refills  
 azithromycin (ZITHROMAX) 250 mg tablet 0 Sig: Si po today then 1 each day until all pills are gone. Theodosia 2 hoy y 1 cada shiva despues Class: Normal  
 Pharmacy: [facility] [address] WENDY FREEMAN [address] [facility],[address] [facility], [facility]  Ph #: [phone]  
 albuterol (PROVENTIL HFA, VENTOLIN HFA, PROAIR HFA) 90 mcg/actuation inhaler 2 Sig: Take 2 Puffs by inhalation every six (6) hours as needed for Wheezing. Please provide the smaller inhaler due to cost  
 Class: Normal  
 Pharmacy: [facility] [address] WENDY FREEMAN [address] [facility],[address] [facility], [address] Ph #: [phone] Route: Inhalation  
 predniSONE (DELTASONE) 10 mg tablet 0 Sig: Si po today then 1 less daily until all are gone. Theodosia 6 hoy y 1 menos pastilla cada shiva  Class: Normal  
 Pharmacy: [facility] [address] WENDY FREEMAN [address] [place] [facility],[address], [facility] [address]  #: [phone] Hicimos lo siguiente ALBUTEROL, INHAL. SOL., FDA-APPROVED FINAL, NON-COMPOUND UNIT DOSE, 1 MG [ HCPCS] AMB POC RAPID STREP A [40169 CPT(R)] Instrucciones de seguimiento Return in about 2 weeks (around 5/11/2018). Instrucciones para el Paciente Faringitis estreptocócica: Instrucciones de cuidado - [ Strep Throat: Care Instructions ] Instrucciones de cuidado La faringitis estreptocócica es seng infección bacteriana que provoca fiebre y dolor de garganta repentinos e intensos. La faringitis estreptocócica, causada por bacterias llamadas estreptococos, se trata con antibióticos. En ocasiones es necesaria seng prueba de estreptococos para determinar si el dolor de garganta es causado por esta bacteria. El tratamiento puede ayudar a aliviar los síntomas y podría prevenir problemas futuros. La atención de seguimiento es seng parte clave de jameson tratamiento y seguridad. Asegúrese de hacer y acudir a todas las citas, y llame a jameson médico si está teniendo problemas. También es seng buena idea saber los resultados de los exámenes y mantener seng lista de los medicamentos que gideon. Cómo puede cuidarse en el hogar? · [address] No deje de tomarlos por el hecho de sentirse mejor. Debe cinthia todos los antibióticos hasta terminarlos. · La faringitis estreptocócica puede contagiarse a otros hasta 24 horas después de iniciado el tratamiento con antibióticos. Patricia elijah Austin, usted debe evitar el contacto con otras personas en el trabajo o jameson hogar, especialmente con vahe y Dannielle. No estornude ni tosa cerca de [employer], y [place] las janae con frecuencia. Rosas Sauce y utensilios de los demás y lávelos shankar con [place] y [place].  
· Karina gárgaras con agua tibia con sal cada hora, rikki mínimo, para ayudar a reducir la hinchazón y sentirse mejor de la garganta. Mezcle 1 cucharadita de sal con 8 onzas líquidas (240 ml) de agua tibia. · Minoa un analgésico (medicamento para el dolor) de venta italia, rikki acetaminofén (Tylenol), ibuprofeno (Advil, Motrin) o naproxeno (Aleve). Marian y siga todas las instrucciones de la [place]. · Pruebe un aerosol anestésico o pastillas para la garganta de venta Deuel, los cuales pueden ayudar a aliviar el dolor de garganta. · Minoa abundantes líquidos. Los líquidos podrían ayudar a aliviar la irritación de la garganta. Los líquidos calientes, rikki el té o las sopas, podrían ayudarle a sentirse mejor de la garganta. · Coma alimentos sólidos suaves y junie abundantes líquidos tomas. También podrían aliviar el dolor de garganta las paletas de agua de sabores, helado, huevos revueltos, sorbete y [place] de gelatina (rikki Jell-O). · Descanse mucho. · No fume y evite el humo de otros. Si necesita ayuda para dejar de fumar, hable con jameson médico sobre programas y medicamentos para dejar de fumar. Estos pueden aumentar fela probabilidades de dejar el hábito para siempre. · Utilice un vaporizador o humidificador para añadir humedad al aire de jameson dormitorio. Siga las instrucciones para limpiar el aparato. Cuándo debe pedir ayuda? Llame a jameson médico ahora mismo o busque atención médica inmediata si: 
? · Tiene fiebre nueva o más nydia. ? · Tiene fiebre junto con rigidez en el jennifer o un dolor de obdulia intenso. ? · Tiene dificultades para tragar o Tinnie Butter. ? · El dolor de garganta empeora mucho en un lado. ? · El dolor se vuelve mucho más intenso en un lado de la garganta. ?Preste especial atención a los cambios en jameson erasmo y asegúrese de comunicarse con jameson médico si: 
? · No mejora después de 2 días (48 horas). ? · No mejora rikki se esperaba. Dónde puede encontrar más información en inglés? Elder Slimmer a [email or web address].info/. Terra Oliveira M969 en la búsqueda para aprender Ted Harishjoss de \"Faringitis estreptocócica: Instrucciones de cuidado - [ Strep Throat: Care Instructions ]. \" 
Revisado: 12 altman, 2017 Versión del contenido: 11.4 © 6273-3210 Healthwise, Incorporated. Las instrucciones de cuidado fueron adaptadas bajo licencia por Good Help Connections (which disclaims liability or warranty for this information). Si usted tiene [place] afección médica o sobre estas instrucciones, siempre pregunte a jameson profesional de erasmo. Healthwise, Incorporated niega toda garantía o responsabilidad por jameson uso de esta información. Neumonía: Instrucciones de cuidado - [ Pneumonia: Care Instructions ] Instrucciones de cuidado La neumonía es seng infección de los pulmones. [place] Harvey de los casos son causados por infecciones debidas a bacterias o virus. La neumonía puede ser leve o muy grave. Si es causada por bacterias, será tratado con antibióticos. La recuperación completa de la neumonía podría cinthia [employer] o algunos meses, dependiendo de qué tan enfermo estuvo y si jameson estado general de erasmo es henry. La atención de seguimiento es seng parte clave de jameson tratamiento y seguridad. Asegúrese de hacer y acudir a todas las citas, y llame a jameson médico si está teniendo problemas. También es seng buena idea saber los resultados de los exámenes y mantener seng lista de los medicamentos que gideon. Cómo puede cuidarse en el hogar? · [address] [place] [address],4 [place] indicaciones. No deje de tomarlos por el hecho de sentirse mejor. Debe cinthia todos los antibióticos hasta terminarlos. · [employer] medicamentos exactamente rikki le fueron recetados. Llame a jameson médico si josefina estar teniendo problemas con jameson medicamento. · Descanse y duerma lo suficiente. Podría sentirse cansado y débil too un [place], zacarias jameson nivel de energía mejorará con [place].  
· Para prevenir la deshidratación, junie abundantes líquidos, los suficientes para que jameson orina sea de color amarillo andres o nirmala rikki el agua. Elija agua y otros líquidos tomas sin cafeína hasta que se sienta mejor. Si tiene seng enfermedad del riñón, del corazón o del hígado y tiene que [place]'s líquidos, hable con jameson médico antes de aumentar jameson consumo. · Trate la tos para que pueda descansar. La tos con mucosidad (flema) de los pulmones es común con la neumonía. Es seng de las [employer] que jameson organismo [place] [address]. Rob si la tos le impide descansar o le causa gran fatiga y dolor en la pared torácica, hable con jameson médico. Podría sugerirle que tome un medicamento para aliviar la tos. · Utilice un vaporizador o humidificador para añadir humedad en jameson habitación. Siga las indicaciones para limpiar el aparato. · No fume ni permita que otras personas fumen cerca de usted. Fumar hará que la tos dure [place]. Si necesita ayuda para dejar de fumar, hable con jameson médico [employer] y medicamentos para dejar de fumar. Éstos pueden aumentar fela probabilidades de dejar el hábito para siempre. · Cora un analgésico (medicamento para el dolor) de venta italia, rikki acetaminofén (Tylenol), ibuprofeno (Advil, Motrin) o naproxeno (Aleve). Marian y siga todas las instrucciones de la [place]. · No tome dos o más analgésicos al mismo tiempo a menos que el médico se lo haya indicado. Muchos analgésicos contienen acetaminofén, es decir, Tylenol. El exceso de acetaminofén (Tylenol) puede ser dañino. · Si le dieron un espirómetro para medir qué tan shankar están funcionando fela pulmones, utilícelo rikki se lo indicaron. [place] puede ayudar a jameson médico a saber cómo va jameson recuperación. · Para prevenir la neumonía en el futuro, hable con jameson médico acerca de vacunarse contra la gripe (seng vez al año) y Dawna Craft antineumocócica (sólo seng vez para la [address]). Cuándo debe pedir ayuda?  
Llame al 911 en cualquier momento que considere que necesita atención de emergencia. Por ejemplo, llame si: 
? · [address] dificultades para respirar. ? Llame a rees médico ahora mismo o busque atención médica inmediata si: 
? · Tose mucosidad (esputo) de color café oscuro o con kristy. ? · Tiene nueva o peor dificultad para respirar. ? · [employer] o aturdimiento, o que está a punto de [place]. ?Preste especial atención a los cambios en rees erasmo y asegúrese de comunicarse con rees médico si: 
? · Presenta fiebre o la fiebre es más nydia. ? · Rees tos es más profunda o más frecuente que antes. ? · No está mejorando después de 2 días (48 horas). ? · No mejora rikki se esperaba. Dónde puede encontrar más información en inglés? Dinorah Casillas a [email or web address] Parviz Her S539 en la búsqueda para aprender más acerca de \"Neumonía: Instrucciones de cuidado - [ Pneumonia: Care Instructions ]. \" 
Revisado: 12 altman, 2017 Versión del contenido: 11.4 © 6039-3962 Healthwise, Incorporated. Las instrucciones de cuidado fueron adaptadas bajo licencia por Good Help Connections (which disclaims liability or warranty for this information). Si usted tiene [place] afección médica o sobre estas instrucciones, siempre pregunte a rees profesional de erasmo. Healthwise, Incorporated niega toda garantía o responsabilidad por rees uso de esta información. Introducing [facility]! [facility] introduce portal paciente MyChart . Ahora se puede acceder a partes de rees expediente médico, enviar por correo electrónico la oficina de rees médico y solicitar renovaciones de medicamentos en línea. En rees navegador de Internet , Lenoria Basil a [email or web address] Karina cruz en el usuario por Carley Escobar? [place] clic aquí en la sesión aLra Boss. Verá la página de registro [place]. Ingrese rees código de [facility] bryson y rikki aparece a continuación.  Usted no tendrá que [employer] código después de ayse completado el proceso de registro . Si usted no se inscribe antes de la fecha de caducidad , debe solicitar un nuevo código. · MyChart Código de acceso : [id number] Expires: 7/26/2018 11:42 AM 
 
Ingresa los últimos cuatro dígitos de jameson Número de Seguro Social ( xxxx ) y fecha de nacimiento ( dd / mm / aaaa ) rikki se indica y karina clic en Enviar. Usted será llevado a la siguiente página de registro . Crear un ID MyChart . Esta será jameson ID de inicio de sesión de MyChart y no puede ser [place] , por lo que pensar en seng que es Holly Mood y fácil de recordar . Crear seng contraseña MyChart . Usted puede cambiar jameson contraseña en cualquier momento . Ingrese jameson Password Reset de preguntas y [place] . [place] se puede utilizar en un momento posterior si usted olvida jameson contraseña. Introduzca jameson dirección de correo electrónico . Gabby Hernandez recibirá segn notificación por correo electrónico cuando la nueva información está disponible en MyChart . Heladio Mead clic en Registrarse. Gisselle Degree cornelius y descargar porciones de jameson expediente médico. 
Karina tamaric en el enlace de descarga del menú Resumen para descargar seng copia portátil de jameson información médica . Si tiene [employer] , por favor visite la sección de preguntas frecuentes del sitio web MyChart . Recuerde, MyChart NO es que se utilizará para las necesidades urgentes. Para emergencias médicas , llame al 911 . Ahora disponible en jameson iPhone y Android ! Por favor proporcione elijah resumen de la documentación de cuidado a jameson próximo proveedor. If you have any questions after today's visit, please call [phone].

## 2018-05-10 ENCOUNTER — OFFICE VISIT (OUTPATIENT)
Dept: FAMILY MEDICINE CLINIC | Age: 33
End: 2018-05-10

## 2018-05-10 VITALS
HEIGHT: 63 IN | HEART RATE: 91 BPM | OXYGEN SATURATION: 97 % | TEMPERATURE: 98.9 F | BODY MASS INDEX: 25.16 KG/M2 | WEIGHT: 142 LBS | SYSTOLIC BLOOD PRESSURE: 115 MMHG | DIASTOLIC BLOOD PRESSURE: 74 MMHG

## 2018-05-10 DIAGNOSIS — R06.2 WHEEZING: Primary | ICD-10-CM

## 2018-05-10 DIAGNOSIS — Z91.09 ENVIRONMENTAL ALLERGIES: ICD-10-CM

## 2018-05-10 DIAGNOSIS — J02.9 SORE THROAT: ICD-10-CM

## 2018-05-10 RX ORDER — ALBUTEROL SULFATE 0.83 MG/ML
2.5 SOLUTION RESPIRATORY (INHALATION) ONCE
Qty: 1 EACH | Refills: 0
Start: 2018-05-10 | End: 2018-05-10

## 2018-05-10 RX ORDER — DOXYCYCLINE 100 MG/1
CAPSULE ORAL
Refills: 0 | COMMUNITY
Start: 2018-05-05 | End: 2018-05-17 | Stop reason: ALTCHOICE

## 2018-05-10 RX ORDER — LORATADINE 10 MG/1
10 TABLET ORAL
Qty: 30 TAB | Refills: 2 | Status: SHIPPED | OUTPATIENT
Start: 2018-05-10

## 2018-05-10 RX ORDER — MONTELUKAST SODIUM 10 MG/1
10 TABLET ORAL DAILY
Qty: 30 TAB | Refills: 2 | Status: SHIPPED | OUTPATIENT
Start: 2018-05-10

## 2018-05-10 RX ORDER — PREDNISONE 20 MG/1
TABLET ORAL
Qty: 15 TAB | Refills: 0 | Status: SHIPPED | OUTPATIENT
Start: 2018-05-10 | End: 2018-05-17 | Stop reason: ALTCHOICE

## 2018-05-10 NOTE — PROGRESS NOTES
Coordination of Care  1. Have you been to the ER, urgent care clinic since your last visit? Hospitalized since your last visit? No    2. Have you seen or consulted any other health care providers outside of the [facility] since your last visit? Include any pap smears or colon screening. Yes When: [facility]/ [facility]    Does the patient need refills? YES    Learning Assessment Complete?  yes

## 2018-05-10 NOTE — PROGRESS NOTES
Started Neb tx at 1040 per provider order pulse ox 98-99% Hr 92   Received order from [address] [place] [address] [employer] to do Nebulizer treatment. 10:38 AM: Started breathing treatment pulse ox 98-99% Hr 92  before starting treatment with Albuterol Sulfate inhalation solution 0.083% 2.5mg/3ml. Pulse ox at 1049 was 99% and HR 96, nurse continue to hear some inspiratory wheezing. Provider notified.  Chelsea Chance RN

## 2018-05-10 NOTE — PROGRESS NOTES
Assessment/Plan:    Diagnoses and all orders for this visit:    1. Wheezing  -     albuterol (PROVENTIL VENTOLIN) 2.5 mg /3 mL (0.083 %) nebulizer solution; 3 mL by Nebulization route once for 1 dose. May repeat upt to 3 times  -     ALBUTEROL, INHAL. NML.()  -     predniSONE (DELTASONE) 20 mg tablet; Sig: 3 po every day for 5 days        2. Environmental allergies  -     loratadine (CLARITIN) 10 mg tablet; Take 1 Tab by mouth daily as needed for Allergies. Saira 1 tab diario para alergias  -     montelukast (SINGULAIR) 10 mg tablet; Take 1 Tab by mouth daily. Follow-up Disposition:  Return in about 1 week (around 5/17/2018). NICOLASA Roberts expressed understanding of this plan. An AVS was printed and given to the patient.      ----------------------------------------------------------------------    Chief Complaint   Patient presents with    Breathing Problem     follow up       History of Present Illness:  Here for f/up appt first episode of wheezing - I had seen her several weeks ago and at that time she also had strep throat. She did not improve on the z-pack, prednisone, albuterol rx's that I gave her so [facility] doxycycline which she is on day #6 of 10 days. She has not noted much improvement in her chest tightness or SOB. She is still wheezing. She is using her albuterol inhaler 2 puffs q 4 hours  She does not have a nebulizer at home  She has been able to work but it is \"hard to go to [employer]        No past medical history on file. Current Outpatient Prescriptions   Medication Sig Dispense Refill    albuterol (PROVENTIL VENTOLIN) 2.5 mg /3 mL (0.083 %) nebulizer solution 3 mL by Nebulization route once for 1 dose. May repeat upt to 3 times 1 Each 0    predniSONE (DELTASONE) 20 mg tablet Sig: 3 po every day for 5 days 15 Tab 0    loratadine (CLARITIN) 10 mg tablet Take 1 Tab by mouth daily as needed for Allergies.  Saira 1 tab diario para alergias 30 Tab 2    montelukast (SINGULAIR) 10 mg tablet Take 1 Tab by mouth daily. 30 Tab 2    doxycycline (VIBRAMYCIN) 100 mg capsule TK 1 C PO Q 12 H  0    albuterol (PROVENTIL HFA, VENTOLIN HFA, PROAIR HFA) 90 mcg/actuation inhaler Take 2 Puffs by inhalation every six (6) hours as needed for Wheezing. Please provide the smaller inhaler due to cost 1 Inhaler 2       No Known Allergies    Social History   Substance Use Topics    Smoking status: Never Smoker    Smokeless tobacco: Never Used    Alcohol use No       No family history on file.     Physical Exam:     Visit Vitals    /74 (BP 1 Location: Right arm, BP Patient Position: Sitting)    Pulse 91    Temp 98.9 °F (37.2 °C) (Oral)    Ht 5' 2.8\" (1.595 m)    Wt 142 lb (64.4 kg)    LMP 04/23/2018    SpO2 97%    BMI 25.32 kg/m2     Pleasant, watery eyes, looks well otherwise  A&Ox3  WDWN NAD  Respirations normal and non labored  Exam- shows codi moderate wheezing expiratory before nebulizer 1  After neb, she has better air movement and less wheezing and symptomatically felt better she states

## 2018-05-10 NOTE — PATIENT INSTRUCTIONS
Hay Muscat o broncoconstricción: Instrucciones de cuidado - [ Wheezing or Bronchoconstriction: Care Instructions ]  Instrucciones de cuidado  La sibilancia es un ruido silbante que se hace too la respiración. Ocurre cuando las vías respiratorias pequeñas, o conductos bronquiales, que conducen a los pulmones se inflaman o se contraen (espasmo) y se estrechan. Glory estrechamiento se conoce rikki broncoconstricción. Cuando las vías respiratorias se contraen, es difícil que pase el aire y esto hace que le sea difícil respirar. La sibilancia y la broncoconstricción pueden ser causadas por muchos problemas, incluyendo:  · Thomas Jeremías, rikki seng gripe o un resfriado. · Alergias rikki la fiebre del heno (rinitis). · Enfermedades rikki el asma o la enfermedad pulmonar obstructiva crónica. · Fumar. El tratamiento para la sibilancia depende de la causa del problema. Las sibilancias pueden mejorar sin tratamiento. Rob es posible que tenga que prestar atención a las cosas que causan jameson sibilancia y evitarlas. O podría necesitar medicamentos para ayudar en el tratamiento de la sibilancia y para reducir la inflamación o para aliviar los espasmos en fela pulmones. La atención de seguimiento es seng parte clave de jameson tratamiento y seguridad. Asegúrese de hacer y acudir a todas las citas, y llame a jameson médico si está teniendo problemas. También es seng buena idea saber los resultados de los exámenes y mantener seng lista de los medicamentos que gideon. ¿Cómo puede cuidarse en el hogar? · Bazine jameson medicamento exactamente rikki le fue indicado. Llame a jameson médico si josefina estar teniendo un problema con fela medicamentos. Recibirá más detalles sobre el medicamento específico recetado por jameson médico.  · Si jameson médico le recetó antibióticos, tómelos según las indicaciones. No deje de tomarlos por el hecho de sentirse mejor. Debe cinthia todos los antibióticos hasta terminarlos.   · Respire aire húmedo de un humidificador, ducha caliente o lavabo lleno de [place]. [place] puede ayudarle a [employer] y facilitarle la respiración. · Si tiene congestión en la nariz y la garganta, beber abundantes líquidos, especialmente líquidos calientes, podría ayudarle a [employer]. Si tiene seng enfermedad del riñón, del corazón o del hígado y tiene que [place]'s líquidos, hable con jameson médico antes de aumentar jameson consumo. · Si tiene mucosidad en las vías respiratorias, podría ser de ayuda respirar profundamente y toser. · No fume ni permita que otros lo aranza cerca de usted. Fumar puede agravar la sibilancia. Si necesita ayuda para dejar de fumar, hable con jameson médico [employer] y medicamentos para dejar de fumar. Éstos pueden aumentar fela probabilidades de dejar el hábito para siempre. · Evite las cosas que pudieran provocarle sibilancias. Éstas podrían incluir resfriados, humo, polvo, contaminación del aire, polen, mascotas, cucarachas, estrés y aire frío. ¿Cuándo debe pedir ayuda? Llame al 911 en cualquier momento que considere que necesita atención de emergencia. Por ejemplo, llame si:  ? · Tiene graves dificultades para respirar. ? · Se desmayó (perdió el conocimiento). ?Llame a jameson médico ahora mismo o busque atención médica inmediata si:  ? · Tose con mucosidad (esputo) amarilla, café oscuro o con kristy. ? · Tiene nueva falta de aire o ésta empeora. ? · La sibilancia empeora o no mejora después de empezar a cinthia el medicamento. ?Preste especial atención a los cambios en jameson erasmo y asegúrese de comunicarse con jameson médico si:  ? · No mejora rikki se esperaba. ¿Dónde puede encontrar más información en inglés? [place] a [email or web address]-eric.info/. Escriba V454 en la búsqueda para aprender más acerca de \"Sibilancia o broncoconstricción: Instrucciones de cuidado - [ Wheezing or Bronchoconstriction: Care Instructions ]. \"  Revisado: 12 Dulce, 2017  Versión del contenido: 11.4  © 2660-1945 Healthwise, Incorporated. Las instrucciones de cuidado fueron adaptadas bajo licencia por Good Help Connections (which disclaims liability or warranty for this information). Si usted tiene [place] afección médica o sobre estas instrucciones, siempre pregunte a jameson profesional de erasmo. [employer] niega toda garantía o responsabilidad por jameson uso de esta información.

## 2018-05-10 NOTE — PROGRESS NOTES
AVS printed and reviewed. E scripts reviewed. Good Rx coupons done for Prednisone $4.92 and Singular $ 25.38 at [place]. Reasons to go to an ER reviewed. Discussion assisted by TED Dee Arm. Sent to registration to schedule 1 week f/u per provider notes.

## 2018-05-10 NOTE — MR AVS SNAPSHOT
[address] [place] [address] [address] 
[phone] Patient: Evangelista Millan MRN: [id number] Visit Information Veronica Adams y Esau Personal Médico Departamento Teléfono del Dep. Número de visita 5/10/2018  9:45 AM Skeet Ketty Guzman, KELLY Carranza [phone] Follow-up Instructions Return in about 1 week (around 5/17/2018). Upcoming Health Maintenance Date Due DTaP/Tdap/Td series (1 - Tdap) 9/6/2004 Influenza Age 5 to Adult 8/1/2018 PAP AKA CERVICAL CYTOLOGY 2/10/2020 Alergias  Review Complete El: 5/10/2018 Por: Angelica Canela A partir del:  5/10/2018 No Known Allergies Vacunas actuales Jennifer Clare No hay ninguna vacuna archivada. No revisadas esta visita You Were Diagnosed With   
  
 Yo Kenan Wheezing    -  Primary ICD-10-CM: R06.2 ICD-9-CM: 786.07 Sore throat     ICD-10-CM: J02.9 ICD-9-CM: 590 Environmental allergies     ICD-10-CM: Z91.09 
ICD-9-CM: V15.09 Partes vitales PS Pulso Temperatura [place] ( percentil de crecimiento) Peso (percentil de crecimiento) LMP (última sachi) 115/74 (BP 1 Location: Right arm, BP Patient Position: Sitting) 91 98.9 °F (37.2 °C) (Oral) 5' 2.8\" (1.595 m) 142 lb (64.4 kg) 04/23/2018 SpO2 BMI (IMC) Estado obstétrico Estatus de tabaquísmo 97% 25.32 kg/m2 Having regular periods Never Smoker Historial de signos vitales BMI and BSA Data Body Mass Index Body Surface Area  
 25.32 kg/m 2 1.69 m 2 [facility] Name Phone [address] [employer] [address], [address] [facility] [phone] Rees lista de medicamentos actualizada Lista actualizada 5/10/18 11:12 AM.  Roxy Whittakermonik use rees lista de medicamentos más reciente. * albuterol 90 mcg/actuation inhaler También conocido rikki:  PROVENTIL HFA, VENTOLIN HFA, PROAIR HFA Take 2 Puffs by inhalation every six (6) hours as needed for Wheezing. Please provide the smaller inhaler due to cost  
  
 * albuterol 2.5 mg /3 mL (0.083 %) nebulizer solution También conocido rikki:  PROVENTIL VENTOLIN  
3 mL by Nebulization route once for 1 dose. May repeat upt to 3 times  
  
 doxycycline 100 mg capsule También conocido rikki:  VIBRAMYCIN  
TK 1 C PO Q 12 H  
  
 loratadine 10 mg tablet También conocido rikki:  Barbara Case Take 1 Tab by mouth daily as needed for Allergies. Saira 1 tab diario para alergias  
  
 montelukast 10 mg tablet También conocido rikki:  SINGULAIR Take 1 Tab by mouth daily. predniSONE 20 mg tablet También conocido rikki:  Clarencekya Bruno Sig: 3 po every day for 5 days * Aviso:  Esta lista contiene medicamentos que son iguales a otros medicamentos recetados para usted. Marian las instrucciones con cuidado y pida a jameson personal médico que revise la lista de medicamentos y las instrucciones correspondientes con usted. Recetas Enviado a la [place] Refills  
 predniSONE (DELTASONE) 20 mg tablet 0 Sig: Sig: 3 po every day for 5 days Class: Normal  
 Pharmacy: [facility] DR WENDY FREEMAN [address] [place] [address] Ph #: [phone]  
 loratadine (CLARITIN) 10 mg tablet 2 Sig: Take 1 Tab by mouth daily as needed for Allergies. Saira 1 tab diario para alergias Class: Normal  
 Pharmacy: [facility] [address] WENDY [address], [address] [facility] Ph #: [phone] Route: Oral  
 montelukast (SINGULAIR) 10 mg tablet 2 Sig: Take 1 Tab by mouth daily. Class: Normal  
 Pharmacy: [facility] [address] WENDY [address], [address] [facility] Ph #: [phone] Route: Oral  
  
Hicimos lo siguiente ALBUTEROL, INHAL. SOL., FDA-APPROVED FINAL, NON-COMPOUND UNIT DOSE, 1 MG [ [facility]] Instrucciones de seguimiento Return in about 1 week (around 5/17/2018). Instrucciones para el Paciente [place] o broncoconstricción: Instrucciones de cuidado - [ Wheezing or Bronchoconstriction: Care Instructions ] Instrucciones de cuidado La sibilancia es un ruido silbante que se hace too la respiración. Ocurre cuando las vías respiratorias pequeñas, o conductos bronquiales, que conducen a los pulmones se inflaman o se contraen (espasmo) y se estrechan. Glory estrechamiento se conoce rikki broncoconstricción. Cuando las vías respiratorias se contraen, es difícil que pase el aire y esto hace que le sea difícil respirar. La sibilancia y la broncoconstricción pueden ser causadas por muchos problemas, incluyendo: 
· Thomas Jeremías, rikki seng gripe o un resfriado. · Alergias rikki la fiebre del heno (rinitis). · Enfermedades rikki el asma o la enfermedad pulmonar obstructiva crónica. · Fumar. El tratamiento para la sibilancia depende de la causa del problema. Las sibilancias pueden mejorar sin tratamiento. Rob es posible que tenga que prestar atención a las cosas que causan jameson sibilancia y evitarlas. O podría necesitar medicamentos para ayudar en el tratamiento de la sibilancia y para reducir la inflamación o para aliviar los espasmos en fela pulmones. La atención de seguimiento es seng parte clave de jameson tratamiento y seguridad. Asegúrese de hacer y acudir a todas las citas, y llame a jameson médico si está teniendo problemas. También es seng buena idea saber los resultados de los exámenes y mantener seng lista de los medicamentos que gideon. Cómo puede cuidarse en el hogar? · [place] jameson medicamento exactamente rikki le fue indicado. Llame a jameson médico si josefina estar teniendo un problema con fela medicamentos. Recibirá más detalles sobre el medicamento específico recetado por jameson médico. 
· Si jameson médico le recetó antibióticos, tómelos según las indicaciones. No deje de tomarlos por el hecho de sentirse mejor. Debe cinthia todos los antibióticos hasta terminarlos. · Respire aire húmedo de un humidificador, ducha caliente o lavabo lleno de [place]. [place] puede ayudarle a Birdi y facilitarle la respiración. · Si tiene congestión en la nariz y la garganta, beber abundantes líquidos, especialmente líquidos calientes, podría ayudarle a Birdi. Si tiene seng enfermedad del riñón, del corazón o del hígado y tiene que Zabrina's líquidos, hable con jameson médico antes de aumentar jameson consumo. · Si tiene mucosidad en las vías respiratorias, podría ser de ayuda respirar profundamente y toser. · No fume ni permita que otros lo aranza cerca de usted. Fumar puede agravar la sibilancia. Si necesita ayuda para dejar de fumar, hable con jameson médico [employer] y medicamentos para dejar de fumar. Éstos pueden aumentar fela probabilidades de dejar el hábito para siempre. · Evite las cosas que pudieran provocarle sibilancias. Éstas podrían incluir resfriados, humo, polvo, contaminación del aire, polen, mascotas, cucarachas, estrés y aire frío. Cuándo debe pedir ayuda? Llame al 911 en cualquier momento que considere que necesita atención de emergencia. Por ejemplo, llame si: 
? · Tiene graves dificultades para respirar. ? · Se desmayó (perdió el conocimiento). ?Llame a jameson médico ahora mismo o busque atención médica inmediata si: 
? · Tose con mucosidad (esputo) amarilla, café oscuro o con kristy. ? · Tiene nueva falta de aire o ésta empeora. ? · La sibilancia empeora o no mejora después de empezar a cinthia el medicamento. ?Preste especial atención a los cambios en jameson erasmo y asegúrese de comunicarse con jameson médico si: 
? · No mejora rikki se esperaba. Dónde puede encontrar más información en inglés? Zaynab Gaona a [email or web address] Escriba V454 en la búsqueda para aprender más acerca de \"Sibilancia o broncoconstricción: Instrucciones de cuidado - [ Wheezing or Bronchoconstriction: Care Instructions ]. \" Revisado: 12 [place], 2017 Versión del contenido: 11.4 © 5002-9446 Healthwise, Incorporated. Las instrucciones de cuidado fueron adaptadas bajo licencia por [employer] (which disclaims liability or warranty for this information). Si usted tiene [place] afección médica o sobre estas instrucciones, siempre pregunte a jameson profesional de erasmo. Healthwise, Incorporated niega toda garantía o responsabilidad por jameson uso de esta información. Introducing [facility]! [facility] introduce portal paciente MyChart . Ahora se puede acceder a partes de jameson expediente médico, enviar por correo electrónico la oficina de jameson médico y solicitar renovaciones de medicamentos en línea. En jameson navegador de Internet , Bogdan Young a [email or web address] Jesus clic en el usuario por Margarite [place]? Jimbo Deep clic aquí en la sesión Jennifer Due. Verá la página de registro [place]. Ingrese jameson código de [employer] bryson y rikki aparece a continuación. Usted no tendrá que [employer] código después de ayse completado el proceso de registro . Si usted no se inscribe antes de la fecha de caducidad , debe solicitar un nuevo código. · MyChart Código de acceso : [id number] Expires: 7/26/2018 11:42 AM 
 
Ingresa los últimos cuatro dígitos de jameson Número de Seguro Social ( xxxx ) y fecha de nacimiento ( dd / mm / aaaa ) rikki se indica y jesus clic en Enviar. [place] será llevado a la siguiente página de registro . Crear un ID MyChart . Esta será jameson ID de inicio de sesión de MyChart y no puede ser [place] , por lo que pensar en seng que es [place] y fácil de recordar . Crear seng contraseña MyChart . Usted puede cambiar jameson contraseña en cualquier momento . Ingrese jameson Password Reset de preguntas y [place] . [place] se puede utilizar en un momento posterior si usted olvida jameson contraseña.  
Introduzca jameson dirección de correo electrónico . Malik Calderon recibirá Jovanny notificación por correo electrónico cuando la nueva información está disponible en MyChart . Estil Hiram arroyo en Registrarse. Latmiguel Melter cornelius y descargar porciones de jameson expediente médico. 
Karina clic en el enlace de descarga del menú Resumen para descargar seng copia portátil de jameson información médica . Si tiene [employer] , por favor visite la sección de preguntas frecuentes del sitio web Sumpto . Recuerde, Sumpto NO es que se utilizará para las necesidades urgentes. Para emergencias médicas , llame al 911 . Ahora disponible en jameson iPhone y Android ! Por favor proporcione elijah resumen de la documentación de cuidado a jameson próximo proveedor. If you have any questions after today's visit, please call [phone].

## 2018-05-17 ENCOUNTER — OFFICE VISIT (OUTPATIENT)
Dept: FAMILY MEDICINE CLINIC | Age: 33
End: 2018-05-17

## 2018-05-17 VITALS
WEIGHT: 143.4 LBS | SYSTOLIC BLOOD PRESSURE: 112 MMHG | HEIGHT: 63 IN | DIASTOLIC BLOOD PRESSURE: 76 MMHG | TEMPERATURE: 99 F | HEART RATE: 97 BPM | OXYGEN SATURATION: 98 % | BODY MASS INDEX: 25.41 KG/M2

## 2018-05-17 DIAGNOSIS — Z91.09 ENVIRONMENTAL ALLERGIES: ICD-10-CM

## 2018-05-17 DIAGNOSIS — Z23 ENCOUNTER FOR IMMUNIZATION: Primary | ICD-10-CM

## 2018-05-17 NOTE — MR AVS SNAPSHOT
[address] [place] [address] [address] [facility] 57 
[phone] Patient: Rayshawn Marshall MRN: [id number] Visit Information Josué Pearl Personal Médico Departamento Teléfono del Dep. Número de visita 5/17/2018 11:30 AM Alberto Martinez MD [address] [phone] Follow-up Instructions Return if symptoms worsen or fail to improve. Upcoming Health Maintenance Date Due DTaP/Tdap/Td series (1 - Tdap) 9/6/2004 Influenza Age 5 to Adult 8/1/2018 PAP AKA CERVICAL CYTOLOGY 2/10/2020 Alergias  Review Complete El: 5/17/2018 Por: Sajna Evans A partir del:  5/17/2018 No Known Allergies Vacunas actuales Merriane Larkin No hay ninguna vacuna archivada. No revisadas esta visita Partes vitales PS Pulso Temperatura [place] ( percentil de crecimiento) Peso (percentil de crecimiento) LMP (última sachi) 112/76 (BP 1 Location: Right arm, BP Patient Position: Sitting) 97 99 °F (37.2 °C) (Oral) 5' 2.91\" (1.598 m) 143 lb 6.4 oz (65 kg) 04/23/2018 SpO2 BMI (IMC) Estado obstétrico Estatus de tabaquísmo 98% 25.47 kg/m2 Having regular periods Never Smoker Historial de signos vitales BMI and BSA Data Body Mass Index Body Surface Area  
 25.47 kg/m 2 1.7 m 2 [facility] Name Phone [address] [employer], [address] [phone] Rees lista de medicamentos actualizada Kelsi Jennings actualizada 5/17/18 12:52 PM.  [place] use rees lista de medicamentos más reciente. albuterol 90 mcg/actuation inhaler También conocido rikki:  PROVENTIL HFA, VENTOLIN HFA, PROAIR HFA Take 2 Puffs by inhalation every six (6) hours as needed for Wheezing. Please provide the smaller inhaler due to cost  
  
 loratadine 10 mg tablet También conocido rikki:  Dorita Fleischer  
 Take 1 Tab by mouth daily as needed for Allergies. Saira 1 tab diario para alergias  
  
 montelukast 10 mg tablet También conocido rikki:  SINGULAIR Take 1 Tab by mouth daily. Instrucciones de seguimiento Return if symptoms worsen or fail to improve. Introducing [facility]! [facility] introduce portal paciente MyChart . Ahora se puede acceder a partes de jameson expediente médico, enviar por correo electrónico la oficina de jameson médico y solicitar renovaciones de medicamentos en línea. En jameson navegador de Internet , Abhi Patel a [email or web address] Jesus clic en el usuario por [place]? Darreld Flood clic aquí en la sesión [facility]. Verá la página de registro [place]. Ingrese jameson código de [employer] y rikki aparece a continuación. Usted no tendrá que [employer] código después de ayse completado el proceso de registro . Si usted no se inscribe antes de la fecha de caducidad , debe solicitar un nuevo código. · MyChart Código de acceso : [id number] Expires: 7/26/2018 11:42 AM 
 
Ingresa los últimos cuatro dígitos de jameson Número de Seguro Social ( xxxx ) y fecha de nacimiento ( dd / mm / aaaa ) rikki se indica y jesus clic en Enviar. Usted será llevado a la siguiente página de registro . Crear un ID MyChart . Esta será jameson ID de inicio de sesión de MyChart y no puede ser [place] , por lo que pensar en seng que es [place] y fácil de recordar . Crear seng contraseña MyChart . Usted puede cambiar jameson contraseña en cualquier momento . Ingrese jameson Password Reset de preguntas y [place] . [place] se puede utilizar en un momento posterior si usted olvida jameson contraseña. Introduzca jameson dirección de correo electrónico . Jason Bessie recibirá seng notificación por correo electrónico cuando la nueva información está disponible en MyChart . [place] clic en Registrarse.  Vernona Curling cornelius y descargar porciones de jameson expediente médico. 
 Karina cruz en el enlace de descarga del menú Resumen para descargar seng copia portátil de jameson información médica . Si tiene [employer] , por favor visite la sección de preguntas frecuentes del sitio web MyChart . Recuerde, MyChart NO es que se utilizará para las necesidades urgentes. Para emergencias médicas , llame al 911 . Ahora disponible en jameson iPhone y Android ! Por favor proporcione elijah resumen de la documentación de cuidado a jameson próximo proveedor. If you have any questions after today's visit, please call [phone].

## 2018-05-17 NOTE — PROGRESS NOTES
Coordination of Care  1. Have you been to the ER, urgent care clinic since your last visit? Hospitalized since your last visit? No    2. Have you seen or consulted any other health care providers outside of the [address] [place] [address] since your last visit? Include any pap smears or colon screening. No    Does the patient need refills? NO    Learning Assessment Complete?  yes

## 2018-05-17 NOTE — PROGRESS NOTES
Subjective:     Chief Complaint   Patient presents with    Asthma     follow up     Sore Throat    Immunization/Injection     she is a 27y.o. year old female who presents for evalution. Asthma sx are about gone now, using albuterol around 2x daily. Still has sore throat but no fever. Taking loratidine and singulair as directed. No past medical history on file. Current Outpatient Prescriptions:     loratadine (CLARITIN) 10 mg tablet, Take 1 Tab by mouth daily as needed for Allergies. Saira 1 tab diario para alergias, Disp: 30 Tab, Rfl: 2    montelukast (SINGULAIR) 10 mg tablet, Take 1 Tab by mouth daily. , Disp: 30 Tab, Rfl: 2    albuterol (PROVENTIL HFA, VENTOLIN HFA, PROAIR HFA) 90 mcg/actuation inhaler, Take 2 Puffs by inhalation every six (6) hours as needed for Wheezing. Please provide the smaller inhaler due to cost, Disp: 1 Inhaler, Rfl: 2  No past surgical history on file. No family history on file. Objective:     Vitals:    05/17/18 1135   BP: 112/76   Pulse: 97   Temp: 99 °F (37.2 °C)   TempSrc: Oral   SpO2: 98%   Weight: 143 lb 6.4 oz (65 kg)   Height: 5' 2.91\" (1.598 m)       Physical Examination: General appearance - alert, well appearing, and in no respiratory distress  Mouth - mucous membranes moist, pharynx normal without lesions  Chest - clear to auscultation, no wheezes, rales or rhonchi, symmetric air entry      Assessment/ Plan:     resoving asthma exaccerbation. Continue current meds, and use a mask when she is outside. Sore throat may be due to incomplete control of allergies. Honey, gargle with salt water, tylenol. F/u if worsening or new sx. Orders Placed This Encounter    Pneumococcal polysaccharide vaccine, 23-valent, adult or immunosuppressed pt dose     F/u prn.

## 2018-05-17 NOTE — PROGRESS NOTES
AVS printed and reviewed. Given VIIS handout. Instructed in aftercare for Vaccines. No adverse reactions. No egg allergy. Discussion assisted by [facility] phone  #[phone].

## 2025-02-06 ENCOUNTER — TRANSCRIBE ORDERS (OUTPATIENT)
Facility: HOSPITAL | Age: 40
End: 2025-02-06

## 2025-02-06 DIAGNOSIS — N88.0: ICD-10-CM

## 2025-02-06 DIAGNOSIS — Z97.5 PRESENCE OF INTRAUTERINE CONTRACEPTIVE DEVICE: Primary | ICD-10-CM
